# Patient Record
Sex: MALE | Race: WHITE | NOT HISPANIC OR LATINO | Employment: OTHER | RURAL
[De-identification: names, ages, dates, MRNs, and addresses within clinical notes are randomized per-mention and may not be internally consistent; named-entity substitution may affect disease eponyms.]

---

## 2017-03-02 ENCOUNTER — HISTORICAL (OUTPATIENT)
Dept: ADMINISTRATIVE | Facility: HOSPITAL | Age: 72
End: 2017-03-02

## 2017-03-08 LAB
LAB AP CLINICAL INFORMATION: NORMAL
LAB AP DIAGNOSIS - HISTORICAL: NORMAL
LAB AP GROSS PATHOLOGY - HISTORICAL: NORMAL
LAB AP SPECIMEN SUBMITTED - HISTORICAL: NORMAL

## 2020-12-11 ENCOUNTER — HISTORICAL (OUTPATIENT)
Dept: ADMINISTRATIVE | Facility: HOSPITAL | Age: 75
End: 2020-12-11

## 2020-12-11 LAB
ALBUMIN SERPL BCP-MCNC: 3 G/DL (ref 3.5–5)
ALBUMIN/GLOB SERPL: 1 {RATIO}
ALP SERPL-CCNC: 103 U/L (ref 45–115)
ALT SERPL W P-5'-P-CCNC: 14 U/L (ref 16–61)
ANION GAP SERPL CALCULATED.3IONS-SCNC: 11 MMOL/L
AST SERPL W P-5'-P-CCNC: 13 U/L (ref 15–37)
BASOPHILS # BLD AUTO: 0.04 X10E3/UL (ref 0–0.2)
BASOPHILS NFR BLD AUTO: 0.6 % (ref 0–1)
BILIRUB SERPL-MCNC: 0.5 MG/DL (ref 0–1.2)
BUN SERPL-MCNC: 20 MG/DL (ref 7–18)
BUN/CREAT SERPL: 18.7
CALCIUM SERPL-MCNC: 8.3 MG/DL (ref 8.5–10.1)
CHLORIDE SERPL-SCNC: 107 MMOL/L (ref 98–107)
CHOLEST SERPL-MCNC: 159 MG/DL
CHOLEST/HDLC SERPL: 3.6 {RATIO}
CO2 SERPL-SCNC: 25 MMOL/L (ref 21–32)
CREAT SERPL-MCNC: 1.07 MG/DL (ref 0.7–1.3)
EOSINOPHIL # BLD AUTO: 0.25 X10E3/UL (ref 0–0.5)
EOSINOPHIL NFR BLD AUTO: 3.8 % (ref 1–4)
ERYTHROCYTE [DISTWIDTH] IN BLOOD BY AUTOMATED COUNT: 13.5 % (ref 11.5–14.5)
GLOBULIN SER-MCNC: 3.1 G/DL (ref 2–4)
GLUCOSE SERPL-MCNC: 93 MG/DL (ref 74–106)
HCT VFR BLD AUTO: 41.7 % (ref 40–54)
HDLC SERPL-MCNC: 44 MG/DL
HGB BLD-MCNC: 13.4 G/DL (ref 13.5–18)
IMM GRANULOCYTES # BLD AUTO: 0.01 X10E3/UL (ref 0–0.04)
IMM GRANULOCYTES NFR BLD: 0.2 % (ref 0–0.4)
LDLC SERPL CALC-MCNC: 94 MG/DL
LYMPHOCYTES # BLD AUTO: 2.17 X10E3/UL (ref 1–4.8)
LYMPHOCYTES NFR BLD AUTO: 32.7 % (ref 27–41)
MCH RBC QN AUTO: 30.3 PG (ref 27–31)
MCHC RBC AUTO-ENTMCNC: 32.1 G/DL (ref 32–36)
MCV RBC AUTO: 94.3 FL (ref 80–96)
MONOCYTES # BLD AUTO: 0.72 X10E3/UL (ref 0–0.8)
MONOCYTES NFR BLD AUTO: 10.8 % (ref 2–6)
MPC BLD CALC-MCNC: 10.6 FL (ref 9.4–12.4)
NEUTROPHILS # BLD AUTO: 3.45 X10E3/UL (ref 1.8–7.7)
NEUTROPHILS NFR BLD AUTO: 51.9 % (ref 53–65)
PLATELET # BLD AUTO: 163 X10E3/UL (ref 150–400)
POTASSIUM SERPL-SCNC: 4.2 MMOL/L (ref 3.5–5.1)
PROT SERPL-MCNC: 6.1 G/DL (ref 6.4–8.2)
RBC # BLD AUTO: 4.42 X10E6/UL (ref 4.6–6.2)
SODIUM SERPL-SCNC: 139 MMOL/L (ref 136–145)
TRIGL SERPL-MCNC: 105 MG/DL
WBC # BLD AUTO: 6.64 X10E3/UL (ref 4.5–11)

## 2021-06-24 ENCOUNTER — LAB REQUISITION (OUTPATIENT)
Dept: LAB | Facility: HOSPITAL | Age: 76
End: 2021-06-24
Attending: FAMILY MEDICINE
Payer: MEDICARE

## 2021-06-24 DIAGNOSIS — D64.9 ANEMIA, UNSPECIFIED: ICD-10-CM

## 2021-06-24 DIAGNOSIS — I63.9 CEREBRAL INFARCTION, UNSPECIFIED: ICD-10-CM

## 2021-06-24 DIAGNOSIS — I48.20 CHRONIC ATRIAL FIBRILLATION, UNSPECIFIED: ICD-10-CM

## 2021-06-24 LAB
ALBUMIN SERPL BCP-MCNC: 2.9 G/DL (ref 3.5–5)
ALBUMIN/GLOB SERPL: 1 {RATIO}
ALP SERPL-CCNC: 101 U/L (ref 45–115)
ALT SERPL W P-5'-P-CCNC: 9 U/L (ref 16–61)
ANION GAP SERPL CALCULATED.3IONS-SCNC: 14 MMOL/L (ref 7–16)
AST SERPL W P-5'-P-CCNC: 11 U/L (ref 15–37)
BASOPHILS # BLD AUTO: 0.04 K/UL (ref 0–0.2)
BASOPHILS NFR BLD AUTO: 0.5 % (ref 0–1)
BILIRUB SERPL-MCNC: 0.4 MG/DL (ref 0–1.2)
BUN SERPL-MCNC: 17 MG/DL (ref 7–18)
BUN/CREAT SERPL: 14 (ref 6–20)
CALCIUM SERPL-MCNC: 8.2 MG/DL (ref 8.5–10.1)
CHLORIDE SERPL-SCNC: 107 MMOL/L (ref 98–107)
CO2 SERPL-SCNC: 26 MMOL/L (ref 21–32)
CREAT SERPL-MCNC: 1.21 MG/DL (ref 0.7–1.3)
DIFFERENTIAL METHOD BLD: ABNORMAL
EOSINOPHIL # BLD AUTO: 0.29 K/UL (ref 0–0.5)
EOSINOPHIL NFR BLD AUTO: 3.5 % (ref 1–4)
ERYTHROCYTE [DISTWIDTH] IN BLOOD BY AUTOMATED COUNT: 14 % (ref 11.5–14.5)
GLOBULIN SER-MCNC: 2.9 G/DL (ref 2–4)
GLUCOSE SERPL-MCNC: 97 MG/DL (ref 74–106)
HCT VFR BLD AUTO: 44.3 % (ref 40–54)
HGB BLD-MCNC: 14 G/DL (ref 13.5–18)
IMM GRANULOCYTES # BLD AUTO: 0.03 K/UL (ref 0–0.04)
IMM GRANULOCYTES NFR BLD: 0.4 % (ref 0–0.4)
LYMPHOCYTES # BLD AUTO: 2.12 K/UL (ref 1–4.8)
LYMPHOCYTES NFR BLD AUTO: 25.9 % (ref 27–41)
MCH RBC QN AUTO: 30.2 PG (ref 27–31)
MCHC RBC AUTO-ENTMCNC: 31.6 G/DL (ref 32–36)
MCV RBC AUTO: 95.5 FL (ref 80–96)
MONOCYTES # BLD AUTO: 0.78 K/UL (ref 0–0.8)
MONOCYTES NFR BLD AUTO: 9.5 % (ref 2–6)
MPC BLD CALC-MCNC: 10.5 FL (ref 9.4–12.4)
NEUTROPHILS # BLD AUTO: 4.93 K/UL (ref 1.8–7.7)
NEUTROPHILS NFR BLD AUTO: 60.2 % (ref 53–65)
PLATELET # BLD AUTO: 173 K/UL (ref 150–400)
POTASSIUM SERPL-SCNC: 4.3 MMOL/L (ref 3.5–5.1)
PROT SERPL-MCNC: 5.8 G/DL (ref 6.4–8.2)
RBC # BLD AUTO: 4.64 M/UL (ref 4.6–6.2)
SODIUM SERPL-SCNC: 143 MMOL/L (ref 136–145)
WBC # BLD AUTO: 8.19 K/UL (ref 4.5–11)

## 2021-06-24 PROCEDURE — 85025 COMPLETE CBC W/AUTO DIFF WBC: CPT | Performed by: FAMILY MEDICINE

## 2021-06-24 PROCEDURE — 80053 COMPREHEN METABOLIC PANEL: CPT | Performed by: FAMILY MEDICINE

## 2021-12-08 ENCOUNTER — LAB REQUISITION (OUTPATIENT)
Dept: LAB | Facility: HOSPITAL | Age: 76
End: 2021-12-08
Attending: FAMILY MEDICINE
Payer: MEDICARE

## 2021-12-08 DIAGNOSIS — E78.5 HYPERLIPIDEMIA, UNSPECIFIED: ICD-10-CM

## 2021-12-08 DIAGNOSIS — I48.20 CHRONIC ATRIAL FIBRILLATION, UNSPECIFIED: ICD-10-CM

## 2021-12-08 DIAGNOSIS — I10 ESSENTIAL (PRIMARY) HYPERTENSION: ICD-10-CM

## 2021-12-08 DIAGNOSIS — I63.9 CEREBRAL INFARCTION, UNSPECIFIED: ICD-10-CM

## 2021-12-08 LAB
ALBUMIN SERPL BCP-MCNC: 3 G/DL (ref 3.5–5)
ALBUMIN/GLOB SERPL: 1 {RATIO}
ALP SERPL-CCNC: 97 U/L (ref 45–115)
ALT SERPL W P-5'-P-CCNC: 11 U/L (ref 16–61)
ANION GAP SERPL CALCULATED.3IONS-SCNC: 12 MMOL/L (ref 7–16)
AST SERPL W P-5'-P-CCNC: 15 U/L (ref 15–37)
BASOPHILS # BLD AUTO: 0.04 K/UL (ref 0–0.2)
BASOPHILS NFR BLD AUTO: 0.5 % (ref 0–1)
BILIRUB SERPL-MCNC: 0.5 MG/DL (ref 0–1.2)
BUN SERPL-MCNC: 19 MG/DL (ref 7–18)
BUN/CREAT SERPL: 15 (ref 6–20)
CALCIUM SERPL-MCNC: 8.2 MG/DL (ref 8.5–10.1)
CHLORIDE SERPL-SCNC: 107 MMOL/L (ref 98–107)
CO2 SERPL-SCNC: 28 MMOL/L (ref 21–32)
CREAT SERPL-MCNC: 1.28 MG/DL (ref 0.7–1.3)
DIFFERENTIAL METHOD BLD: ABNORMAL
EOSINOPHIL # BLD AUTO: 0.25 K/UL (ref 0–0.5)
EOSINOPHIL NFR BLD AUTO: 3.1 % (ref 1–4)
ERYTHROCYTE [DISTWIDTH] IN BLOOD BY AUTOMATED COUNT: 14.4 % (ref 11.5–14.5)
GLOBULIN SER-MCNC: 3.1 G/DL (ref 2–4)
GLUCOSE SERPL-MCNC: 86 MG/DL (ref 74–106)
HCT VFR BLD AUTO: 44.9 % (ref 40–54)
HGB BLD-MCNC: 14.1 G/DL (ref 13.5–18)
IMM GRANULOCYTES # BLD AUTO: 0.01 K/UL (ref 0–0.04)
IMM GRANULOCYTES NFR BLD: 0.1 % (ref 0–0.4)
LYMPHOCYTES # BLD AUTO: 2.24 K/UL (ref 1–4.8)
LYMPHOCYTES NFR BLD AUTO: 28 % (ref 27–41)
MCH RBC QN AUTO: 30.1 PG (ref 27–31)
MCHC RBC AUTO-ENTMCNC: 31.4 G/DL (ref 32–36)
MCV RBC AUTO: 95.9 FL (ref 80–96)
MONOCYTES # BLD AUTO: 0.72 K/UL (ref 0–0.8)
MONOCYTES NFR BLD AUTO: 9 % (ref 2–6)
MPC BLD CALC-MCNC: 10.6 FL (ref 9.4–12.4)
NEUTROPHILS # BLD AUTO: 4.73 K/UL (ref 1.8–7.7)
NEUTROPHILS NFR BLD AUTO: 59.3 % (ref 53–65)
PLATELET # BLD AUTO: 173 K/UL (ref 150–400)
POTASSIUM SERPL-SCNC: 4.5 MMOL/L (ref 3.5–5.1)
PROT SERPL-MCNC: 6.1 G/DL (ref 6.4–8.2)
RBC # BLD AUTO: 4.68 M/UL (ref 4.6–6.2)
SODIUM SERPL-SCNC: 142 MMOL/L (ref 136–145)
WBC # BLD AUTO: 7.99 K/UL (ref 4.5–11)

## 2021-12-08 PROCEDURE — 84075 ASSAY ALKALINE PHOSPHATASE: CPT | Performed by: FAMILY MEDICINE

## 2021-12-08 PROCEDURE — 80053 COMPREHEN METABOLIC PANEL: CPT | Performed by: FAMILY MEDICINE

## 2021-12-08 PROCEDURE — 84450 TRANSFERASE (AST) (SGOT): CPT | Performed by: FAMILY MEDICINE

## 2021-12-08 PROCEDURE — 85025 COMPLETE CBC W/AUTO DIFF WBC: CPT | Performed by: FAMILY MEDICINE

## 2022-01-01 ENCOUNTER — LAB REQUISITION (OUTPATIENT)
Dept: LAB | Facility: HOSPITAL | Age: 77
End: 2022-01-01
Attending: FAMILY MEDICINE
Payer: MEDICARE

## 2022-01-01 ENCOUNTER — HOSPITAL ENCOUNTER (EMERGENCY)
Facility: HOSPITAL | Age: 77
Discharge: NURSING, REHAB OR LONG-TERM CARE | End: 2022-12-21
Attending: EMERGENCY MEDICINE
Payer: MEDICARE

## 2022-01-01 ENCOUNTER — HOSPITAL ENCOUNTER (OUTPATIENT)
Dept: RADIOLOGY | Facility: HOSPITAL | Age: 77
Discharge: HOME | End: 2022-11-18
Attending: FAMILY MEDICINE
Payer: MEDICARE

## 2022-01-01 VITALS
BODY MASS INDEX: 26.36 KG/M2 | HEIGHT: 66 IN | SYSTOLIC BLOOD PRESSURE: 165 MMHG | OXYGEN SATURATION: 97 % | WEIGHT: 164 LBS | DIASTOLIC BLOOD PRESSURE: 86 MMHG | HEART RATE: 106 BPM | RESPIRATION RATE: 16 BRPM | TEMPERATURE: 98 F

## 2022-01-01 DIAGNOSIS — D64.9 ANEMIA, UNSPECIFIED: ICD-10-CM

## 2022-01-01 DIAGNOSIS — I48.91 ATRIAL FIBRILLATION WITH TACHYCARDIC VENTRICULAR RATE: Primary | ICD-10-CM

## 2022-01-01 DIAGNOSIS — I10 ESSENTIAL (PRIMARY) HYPERTENSION: ICD-10-CM

## 2022-01-01 DIAGNOSIS — R97.20 ELEVATED PROSTATE SPECIFIC ANTIGEN (PSA): ICD-10-CM

## 2022-01-01 DIAGNOSIS — K76.9 LIVER DISEASE, UNSPECIFIED: ICD-10-CM

## 2022-01-01 DIAGNOSIS — I63.9 CEREBRAL INFARCTION, UNSPECIFIED: ICD-10-CM

## 2022-01-01 DIAGNOSIS — R79.1 ABNORMAL COAGULATION PROFILE: ICD-10-CM

## 2022-01-01 DIAGNOSIS — I11.0 HYPERTENSIVE HEART DISEASE WITH HEART FAILURE: ICD-10-CM

## 2022-01-01 DIAGNOSIS — I48.20 CHRONIC ATRIAL FIBRILLATION, UNSPECIFIED: ICD-10-CM

## 2022-01-01 DIAGNOSIS — I69.351 HEMIPLEGIA AND HEMIPARESIS FOLLOWING CEREBRAL INFARCTION AFFECTING RIGHT DOMINANT SIDE: ICD-10-CM

## 2022-01-01 DIAGNOSIS — N40.0 BENIGN PROSTATIC HYPERPLASIA WITHOUT LOWER URINARY TRACT SYMPTOMS: ICD-10-CM

## 2022-01-01 DIAGNOSIS — E78.5 HYPERLIPIDEMIA, UNSPECIFIED: ICD-10-CM

## 2022-01-01 DIAGNOSIS — I50.9 CHRONIC CONGESTIVE HEART FAILURE, UNSPECIFIED HEART FAILURE TYPE: ICD-10-CM

## 2022-01-01 DIAGNOSIS — R16.0 LIVER MASS: ICD-10-CM

## 2022-01-01 DIAGNOSIS — R07.9 CHEST PAIN: ICD-10-CM

## 2022-01-01 LAB
ALBUMIN SERPL BCP-MCNC: 2.8 G/DL (ref 3.5–5)
ALBUMIN SERPL BCP-MCNC: 2.9 G/DL (ref 3.5–5)
ALBUMIN SERPL BCP-MCNC: 2.9 G/DL (ref 3.5–5)
ALBUMIN SERPL BCP-MCNC: 3 G/DL (ref 3.5–5)
ALBUMIN SERPL BCP-MCNC: 3.1 G/DL (ref 3.5–5)
ALBUMIN SERPL BCP-MCNC: 3.1 G/DL (ref 3.5–5)
ALBUMIN/GLOB SERPL: 0.9 {RATIO}
ALBUMIN/GLOB SERPL: 1 {RATIO}
ALP SERPL-CCNC: 141 U/L (ref 45–115)
ALP SERPL-CCNC: 145 U/L (ref 45–115)
ALP SERPL-CCNC: 150 U/L (ref 45–115)
ALP SERPL-CCNC: 157 U/L (ref 45–115)
ALP SERPL-CCNC: 179 U/L (ref 45–115)
ALP SERPL-CCNC: 346 U/L (ref 45–115)
ALP SERPL-CCNC: 412 U/L (ref 45–115)
ALP SERPL-CCNC: 424 U/L (ref 45–115)
ALP SERPL-CCNC: 96 U/L (ref 45–115)
ALT SERPL W P-5'-P-CCNC: 10 U/L (ref 16–61)
ALT SERPL W P-5'-P-CCNC: 14 U/L (ref 16–61)
ALT SERPL W P-5'-P-CCNC: 167 U/L (ref 16–61)
ALT SERPL W P-5'-P-CCNC: 17 U/L (ref 16–61)
ALT SERPL W P-5'-P-CCNC: 18 U/L (ref 16–61)
ALT SERPL W P-5'-P-CCNC: 185 U/L (ref 16–61)
ALT SERPL W P-5'-P-CCNC: 20 U/L (ref 16–61)
ALT SERPL W P-5'-P-CCNC: 215 U/L (ref 16–61)
ALT SERPL W P-5'-P-CCNC: 28 U/L (ref 16–61)
ANION GAP SERPL CALCULATED.3IONS-SCNC: 11 MMOL/L (ref 7–16)
ANION GAP SERPL CALCULATED.3IONS-SCNC: 11 MMOL/L (ref 7–16)
ANION GAP SERPL CALCULATED.3IONS-SCNC: 12 MMOL/L (ref 7–16)
ANION GAP SERPL CALCULATED.3IONS-SCNC: 12 MMOL/L (ref 7–16)
ANION GAP SERPL CALCULATED.3IONS-SCNC: 13 MMOL/L (ref 7–16)
ANION GAP SERPL CALCULATED.3IONS-SCNC: 8 MMOL/L (ref 7–16)
APTT PPP: 32.8 SECONDS (ref 25.2–37.3)
AST SERPL W P-5'-P-CCNC: 10 U/L (ref 15–37)
AST SERPL W P-5'-P-CCNC: 106 U/L (ref 15–37)
AST SERPL W P-5'-P-CCNC: 114 U/L (ref 15–37)
AST SERPL W P-5'-P-CCNC: 21 U/L (ref 15–37)
AST SERPL W P-5'-P-CCNC: 23 U/L (ref 15–37)
AST SERPL W P-5'-P-CCNC: 26 U/L (ref 15–37)
AST SERPL W P-5'-P-CCNC: 30 U/L (ref 15–37)
AST SERPL W P-5'-P-CCNC: 53 U/L (ref 15–37)
AST SERPL W P-5'-P-CCNC: 85 U/L (ref 15–37)
BASOPHILS # BLD AUTO: 0.04 K/UL (ref 0–0.2)
BASOPHILS # BLD AUTO: 0.05 K/UL (ref 0–0.2)
BASOPHILS # BLD AUTO: 0.06 K/UL (ref 0–0.2)
BASOPHILS # BLD AUTO: 0.07 K/UL (ref 0–0.2)
BASOPHILS # BLD AUTO: 0.08 K/UL (ref 0–0.2)
BASOPHILS NFR BLD AUTO: 0.5 % (ref 0–1)
BASOPHILS NFR BLD AUTO: 0.6 % (ref 0–1)
BASOPHILS NFR BLD AUTO: 0.6 % (ref 0–1)
BASOPHILS NFR BLD AUTO: 0.7 % (ref 0–1)
BASOPHILS NFR BLD AUTO: 0.8 % (ref 0–1)
BASOPHILS NFR BLD AUTO: 0.9 % (ref 0–1)
BASOPHILS NFR BLD AUTO: 1.1 % (ref 0–1)
BILIRUB DIRECT SERPL-MCNC: 5.1 MG/DL (ref 0–0.2)
BILIRUB SERPL-MCNC: 0.4 MG/DL (ref 0–1.2)
BILIRUB SERPL-MCNC: 0.6 MG/DL (ref ?–1.2)
BILIRUB SERPL-MCNC: 0.6 MG/DL (ref ?–1.2)
BILIRUB SERPL-MCNC: 0.7 MG/DL (ref ?–1.2)
BILIRUB SERPL-MCNC: 0.7 MG/DL (ref ?–1.2)
BILIRUB SERPL-MCNC: 0.8 MG/DL (ref ?–1.2)
BILIRUB SERPL-MCNC: 2.1 MG/DL (ref ?–1.2)
BILIRUB SERPL-MCNC: 5.4 MG/DL (ref ?–1.2)
BILIRUB SERPL-MCNC: 5.9 MG/DL (ref ?–1.2)
BUN SERPL-MCNC: 14 MG/DL (ref 7–18)
BUN SERPL-MCNC: 15 MG/DL (ref 7–18)
BUN SERPL-MCNC: 17 MG/DL (ref 7–18)
BUN SERPL-MCNC: 18 MG/DL (ref 7–18)
BUN SERPL-MCNC: 18 MG/DL (ref 7–18)
BUN SERPL-MCNC: 19 MG/DL (ref 7–18)
BUN SERPL-MCNC: 20 MG/DL (ref 7–18)
BUN SERPL-MCNC: 20 MG/DL (ref 7–18)
BUN SERPL-MCNC: 28 MG/DL (ref 7–18)
BUN/CREAT SERPL: 12 (ref 6–20)
BUN/CREAT SERPL: 12 (ref 6–20)
BUN/CREAT SERPL: 14 (ref 6–20)
BUN/CREAT SERPL: 15 (ref 6–20)
BUN/CREAT SERPL: 16 (ref 6–20)
BUN/CREAT SERPL: 18 (ref 6–20)
BUN/CREAT SERPL: 21 (ref 6–20)
CALCIUM SERPL-MCNC: 8.1 MG/DL (ref 8.5–10.1)
CALCIUM SERPL-MCNC: 8.2 MG/DL (ref 8.5–10.1)
CALCIUM SERPL-MCNC: 8.2 MG/DL (ref 8.5–10.1)
CALCIUM SERPL-MCNC: 8.4 MG/DL (ref 8.5–10.1)
CALCIUM SERPL-MCNC: 8.5 MG/DL (ref 8.5–10.1)
CALCIUM SERPL-MCNC: 8.5 MG/DL (ref 8.5–10.1)
CALCIUM SERPL-MCNC: 8.7 MG/DL (ref 8.5–10.1)
CHLORIDE SERPL-SCNC: 100 MMOL/L (ref 98–107)
CHLORIDE SERPL-SCNC: 103 MMOL/L (ref 98–107)
CHLORIDE SERPL-SCNC: 107 MMOL/L (ref 98–107)
CHLORIDE SERPL-SCNC: 107 MMOL/L (ref 98–107)
CHLORIDE SERPL-SCNC: 108 MMOL/L (ref 98–107)
CHLORIDE SERPL-SCNC: 108 MMOL/L (ref 98–107)
CHLORIDE SERPL-SCNC: 109 MMOL/L (ref 98–107)
CHLORIDE SERPL-SCNC: 109 MMOL/L (ref 98–107)
CHLORIDE SERPL-SCNC: 110 MMOL/L (ref 98–107)
CO2 SERPL-SCNC: 24 MMOL/L (ref 21–32)
CO2 SERPL-SCNC: 24 MMOL/L (ref 21–32)
CO2 SERPL-SCNC: 25 MMOL/L (ref 21–32)
CO2 SERPL-SCNC: 27 MMOL/L (ref 21–32)
CO2 SERPL-SCNC: 29 MMOL/L (ref 21–32)
CREAT SERPL-MCNC: 1.12 MG/DL (ref 0.7–1.3)
CREAT SERPL-MCNC: 1.12 MG/DL (ref 0.7–1.3)
CREAT SERPL-MCNC: 1.15 MG/DL (ref 0.7–1.3)
CREAT SERPL-MCNC: 1.17 MG/DL (ref 0.7–1.3)
CREAT SERPL-MCNC: 1.2 MG/DL (ref 0.7–1.3)
CREAT SERPL-MCNC: 1.26 MG/DL (ref 0.7–1.3)
CREAT SERPL-MCNC: 1.32 MG/DL (ref 0.7–1.3)
CREAT SERPL-MCNC: 1.32 MG/DL (ref 0.7–1.3)
CREAT SERPL-MCNC: 1.35 MG/DL (ref 0.7–1.3)
DIFFERENTIAL METHOD BLD: ABNORMAL
EGFR (NO RACE VARIABLE) (RUSH/TITUS): 54 ML/MIN/1.73M²
EGFR (NO RACE VARIABLE) (RUSH/TITUS): 56 ML/MIN/1.73M²
EGFR (NO RACE VARIABLE) (RUSH/TITUS): 56 ML/MIN/1.73M²
EGFR (NO RACE VARIABLE) (RUSH/TITUS): 59 ML/MIN/1.73M²
EGFR (NO RACE VARIABLE) (RUSH/TITUS): 63 ML/MIN/1.73M²
EGFR (NO RACE VARIABLE) (RUSH/TITUS): 66 ML/MIN/1.73M²
EGFR (NO RACE VARIABLE) (RUSH/TITUS): 68 ML/MIN/1.73M²
EGFR (NO RACE VARIABLE) (RUSH/TITUS): 68 ML/MIN/1.73M²
EOSINOPHIL # BLD AUTO: 0.18 K/UL (ref 0–0.5)
EOSINOPHIL # BLD AUTO: 0.2 K/UL (ref 0–0.5)
EOSINOPHIL # BLD AUTO: 0.25 K/UL (ref 0–0.5)
EOSINOPHIL # BLD AUTO: 0.26 K/UL (ref 0–0.5)
EOSINOPHIL # BLD AUTO: 0.27 K/UL (ref 0–0.5)
EOSINOPHIL # BLD AUTO: 0.27 K/UL (ref 0–0.5)
EOSINOPHIL # BLD AUTO: 0.32 K/UL (ref 0–0.5)
EOSINOPHIL # BLD AUTO: 0.33 K/UL (ref 0–0.5)
EOSINOPHIL # BLD AUTO: 0.43 K/UL (ref 0–0.5)
EOSINOPHIL NFR BLD AUTO: 2.1 % (ref 1–4)
EOSINOPHIL NFR BLD AUTO: 2.6 % (ref 1–4)
EOSINOPHIL NFR BLD AUTO: 3.1 % (ref 1–4)
EOSINOPHIL NFR BLD AUTO: 3.5 % (ref 1–4)
EOSINOPHIL NFR BLD AUTO: 3.6 % (ref 1–4)
EOSINOPHIL NFR BLD AUTO: 3.6 % (ref 1–4)
EOSINOPHIL NFR BLD AUTO: 4 % (ref 1–4)
EOSINOPHIL NFR BLD AUTO: 4.6 % (ref 1–4)
EOSINOPHIL NFR BLD AUTO: 5.8 % (ref 1–4)
ERYTHROCYTE [DISTWIDTH] IN BLOOD BY AUTOMATED COUNT: 13.4 % (ref 11.5–14.5)
ERYTHROCYTE [DISTWIDTH] IN BLOOD BY AUTOMATED COUNT: 13.7 % (ref 11.5–14.5)
ERYTHROCYTE [DISTWIDTH] IN BLOOD BY AUTOMATED COUNT: 13.7 % (ref 11.5–14.5)
ERYTHROCYTE [DISTWIDTH] IN BLOOD BY AUTOMATED COUNT: 13.9 % (ref 11.5–14.5)
ERYTHROCYTE [DISTWIDTH] IN BLOOD BY AUTOMATED COUNT: 14.1 % (ref 11.5–14.5)
ERYTHROCYTE [DISTWIDTH] IN BLOOD BY AUTOMATED COUNT: 14.3 % (ref 11.5–14.5)
ERYTHROCYTE [DISTWIDTH] IN BLOOD BY AUTOMATED COUNT: 14.5 % (ref 11.5–14.5)
ERYTHROCYTE [DISTWIDTH] IN BLOOD BY AUTOMATED COUNT: 14.6 % (ref 11.5–14.5)
ERYTHROCYTE [DISTWIDTH] IN BLOOD BY AUTOMATED COUNT: 14.7 % (ref 11.5–14.5)
FERRITIN SERPL-MCNC: 947 NG/ML (ref 26–388)
FOLATE SERPL-MCNC: 9.7 NG/ML (ref 3.1–17.5)
GLOBULIN SER-MCNC: 2.7 G/DL (ref 2–4)
GLOBULIN SER-MCNC: 3 G/DL (ref 2–4)
GLOBULIN SER-MCNC: 3.1 G/DL (ref 2–4)
GLOBULIN SER-MCNC: 3.1 G/DL (ref 2–4)
GLOBULIN SER-MCNC: 3.2 G/DL (ref 2–4)
GLOBULIN SER-MCNC: 3.2 G/DL (ref 2–4)
GLOBULIN SER-MCNC: 3.3 G/DL (ref 2–4)
GLOBULIN SER-MCNC: 3.3 G/DL (ref 2–4)
GLOBULIN SER-MCNC: 3.6 G/DL (ref 2–4)
GLUCOSE SERPL-MCNC: 100 MG/DL (ref 74–106)
GLUCOSE SERPL-MCNC: 112 MG/DL (ref 74–106)
GLUCOSE SERPL-MCNC: 112 MG/DL (ref 74–106)
GLUCOSE SERPL-MCNC: 140 MG/DL (ref 74–106)
GLUCOSE SERPL-MCNC: 86 MG/DL (ref 74–106)
GLUCOSE SERPL-MCNC: 86 MG/DL (ref 74–106)
GLUCOSE SERPL-MCNC: 90 MG/DL (ref 74–106)
GLUCOSE SERPL-MCNC: 90 MG/DL (ref 74–106)
GLUCOSE SERPL-MCNC: 91 MG/DL (ref 74–106)
HAV IGM SER QL: NORMAL
HBV CORE IGM SER QL: NORMAL
HBV SURFACE AG SERPL QL IA: NORMAL
HCT VFR BLD AUTO: 37.4 % (ref 40–54)
HCT VFR BLD AUTO: 38.7 % (ref 40–54)
HCT VFR BLD AUTO: 39.2 % (ref 40–54)
HCT VFR BLD AUTO: 39.7 % (ref 40–54)
HCT VFR BLD AUTO: 40.6 % (ref 40–54)
HCT VFR BLD AUTO: 40.6 % (ref 40–54)
HCT VFR BLD AUTO: 41 % (ref 40–54)
HCT VFR BLD AUTO: 42.2 % (ref 40–54)
HCT VFR BLD AUTO: 46.5 % (ref 40–54)
HCV AB SER QL: NORMAL
HGB BLD-MCNC: 11.9 G/DL (ref 13.5–18)
HGB BLD-MCNC: 12.1 G/DL (ref 13.5–18)
HGB BLD-MCNC: 12.6 G/DL (ref 13.5–18)
HGB BLD-MCNC: 12.6 G/DL (ref 13.5–18)
HGB BLD-MCNC: 12.9 G/DL (ref 13.5–18)
HGB BLD-MCNC: 13 G/DL (ref 13.5–18)
HGB BLD-MCNC: 13.1 G/DL (ref 13.5–18)
HGB BLD-MCNC: 13.4 G/DL (ref 13.5–18)
HGB BLD-MCNC: 15 G/DL (ref 13.5–18)
IMM GRANULOCYTES # BLD AUTO: 0.01 K/UL (ref 0–0.04)
IMM GRANULOCYTES # BLD AUTO: 0.02 K/UL (ref 0–0.04)
IMM GRANULOCYTES NFR BLD: 0.1 % (ref 0–0.4)
IMM GRANULOCYTES NFR BLD: 0.2 % (ref 0–0.4)
IMM GRANULOCYTES NFR BLD: 0.2 % (ref 0–0.4)
IMM GRANULOCYTES NFR BLD: 0.3 % (ref 0–0.4)
IMM GRANULOCYTES NFR BLD: 0.3 % (ref 0–0.4)
IMM RETICS NFR: 5.8 % (ref 2.3–13.4)
INR BLD: 0.96
IRON SATN MFR SERPL: 69 % (ref 14–50)
IRON SERPL-MCNC: 116 ΜG/DL (ref 65–175)
LYMPHOCYTES # BLD AUTO: 1.39 K/UL (ref 1–4.8)
LYMPHOCYTES # BLD AUTO: 1.46 K/UL (ref 1–4.8)
LYMPHOCYTES # BLD AUTO: 1.88 K/UL (ref 1–4.8)
LYMPHOCYTES # BLD AUTO: 1.89 K/UL (ref 1–4.8)
LYMPHOCYTES # BLD AUTO: 1.99 K/UL (ref 1–4.8)
LYMPHOCYTES # BLD AUTO: 2.03 K/UL (ref 1–4.8)
LYMPHOCYTES # BLD AUTO: 2.03 K/UL (ref 1–4.8)
LYMPHOCYTES # BLD AUTO: 2.07 K/UL (ref 1–4.8)
LYMPHOCYTES # BLD AUTO: 2.71 K/UL (ref 1–4.8)
LYMPHOCYTES NFR BLD AUTO: 14.8 % (ref 27–41)
LYMPHOCYTES NFR BLD AUTO: 21.4 % (ref 27–41)
LYMPHOCYTES NFR BLD AUTO: 23.4 % (ref 27–41)
LYMPHOCYTES NFR BLD AUTO: 26.3 % (ref 27–41)
LYMPHOCYTES NFR BLD AUTO: 26.7 % (ref 27–41)
LYMPHOCYTES NFR BLD AUTO: 26.9 % (ref 27–41)
LYMPHOCYTES NFR BLD AUTO: 28 % (ref 27–41)
LYMPHOCYTES NFR BLD AUTO: 29.5 % (ref 27–41)
LYMPHOCYTES NFR BLD AUTO: 30.2 % (ref 27–41)
MCH RBC QN AUTO: 30.3 PG (ref 27–31)
MCH RBC QN AUTO: 30.4 PG (ref 27–31)
MCH RBC QN AUTO: 30.6 PG (ref 27–31)
MCH RBC QN AUTO: 30.6 PG (ref 27–31)
MCH RBC QN AUTO: 30.7 PG (ref 27–31)
MCH RBC QN AUTO: 30.7 PG (ref 27–31)
MCH RBC QN AUTO: 30.8 PG (ref 27–31)
MCH RBC QN AUTO: 31 PG (ref 27–31)
MCH RBC QN AUTO: 31.2 PG (ref 27–31)
MCHC RBC AUTO-ENTMCNC: 31.3 G/DL (ref 32–36)
MCHC RBC AUTO-ENTMCNC: 31.7 G/DL (ref 32–36)
MCHC RBC AUTO-ENTMCNC: 31.7 G/DL (ref 32–36)
MCHC RBC AUTO-ENTMCNC: 31.8 G/DL (ref 32–36)
MCHC RBC AUTO-ENTMCNC: 32.1 G/DL (ref 32–36)
MCHC RBC AUTO-ENTMCNC: 32.3 G/DL (ref 32–36)
MCHC RBC AUTO-ENTMCNC: 32.3 G/DL (ref 32–36)
MCV RBC AUTO: 94 FL (ref 80–96)
MCV RBC AUTO: 95.1 FL (ref 80–96)
MCV RBC AUTO: 96.2 FL (ref 80–96)
MCV RBC AUTO: 96.3 FL (ref 80–96)
MCV RBC AUTO: 96.6 FL (ref 80–96)
MCV RBC AUTO: 96.9 FL (ref 80–96)
MCV RBC AUTO: 97.1 FL (ref 80–96)
MCV RBC AUTO: 97.2 FL (ref 80–96)
MCV RBC AUTO: 97.9 FL (ref 80–96)
MONOCYTES # BLD AUTO: 0.86 K/UL (ref 0–0.8)
MONOCYTES # BLD AUTO: 0.86 K/UL (ref 0–0.8)
MONOCYTES # BLD AUTO: 0.87 K/UL (ref 0–0.8)
MONOCYTES # BLD AUTO: 0.9 K/UL (ref 0–0.8)
MONOCYTES # BLD AUTO: 0.91 K/UL (ref 0–0.8)
MONOCYTES # BLD AUTO: 0.93 K/UL (ref 0–0.8)
MONOCYTES # BLD AUTO: 0.94 K/UL (ref 0–0.8)
MONOCYTES # BLD AUTO: 0.97 K/UL (ref 0–0.8)
MONOCYTES # BLD AUTO: 1 K/UL (ref 0–0.8)
MONOCYTES NFR BLD AUTO: 10.5 % (ref 2–6)
MONOCYTES NFR BLD AUTO: 10.6 % (ref 2–6)
MONOCYTES NFR BLD AUTO: 11.2 % (ref 2–6)
MONOCYTES NFR BLD AUTO: 11.8 % (ref 2–6)
MONOCYTES NFR BLD AUTO: 12.1 % (ref 2–6)
MONOCYTES NFR BLD AUTO: 12.3 % (ref 2–6)
MONOCYTES NFR BLD AUTO: 12.5 % (ref 2–6)
MONOCYTES NFR BLD AUTO: 12.6 % (ref 2–6)
MONOCYTES NFR BLD AUTO: 13.6 % (ref 2–6)
MPC BLD CALC-MCNC: 10.2 FL (ref 9.4–12.4)
MPC BLD CALC-MCNC: 10.3 FL (ref 9.4–12.4)
MPC BLD CALC-MCNC: 10.4 FL (ref 9.4–12.4)
MPC BLD CALC-MCNC: 10.5 FL (ref 9.4–12.4)
MPC BLD CALC-MCNC: 10.7 FL (ref 9.4–12.4)
MPC BLD CALC-MCNC: 10.9 FL (ref 9.4–12.4)
MPC BLD CALC-MCNC: 10.9 FL (ref 9.4–12.4)
NEUTROPHILS # BLD AUTO: 3.76 K/UL (ref 1.8–7.7)
NEUTROPHILS # BLD AUTO: 3.89 K/UL (ref 1.8–7.7)
NEUTROPHILS # BLD AUTO: 4.05 K/UL (ref 1.8–7.7)
NEUTROPHILS # BLD AUTO: 4.13 K/UL (ref 1.8–7.7)
NEUTROPHILS # BLD AUTO: 4.17 K/UL (ref 1.8–7.7)
NEUTROPHILS # BLD AUTO: 4.26 K/UL (ref 1.8–7.7)
NEUTROPHILS # BLD AUTO: 4.9 K/UL (ref 1.8–7.7)
NEUTROPHILS # BLD AUTO: 4.98 K/UL (ref 1.8–7.7)
NEUTROPHILS # BLD AUTO: 6.74 K/UL (ref 1.8–7.7)
NEUTROPHILS NFR BLD AUTO: 54.2 % (ref 53–65)
NEUTROPHILS NFR BLD AUTO: 54.5 % (ref 53–65)
NEUTROPHILS NFR BLD AUTO: 54.7 % (ref 53–65)
NEUTROPHILS NFR BLD AUTO: 54.7 % (ref 53–65)
NEUTROPHILS NFR BLD AUTO: 55.7 % (ref 53–65)
NEUTROPHILS NFR BLD AUTO: 56.4 % (ref 53–65)
NEUTROPHILS NFR BLD AUTO: 61 % (ref 53–65)
NEUTROPHILS NFR BLD AUTO: 61.6 % (ref 53–65)
NEUTROPHILS NFR BLD AUTO: 71.8 % (ref 53–65)
NT-PROBNP SERPL-MCNC: 3773 PG/ML (ref 1–450)
PLATELET # BLD AUTO: 155 K/UL (ref 150–400)
PLATELET # BLD AUTO: 158 K/UL (ref 150–400)
PLATELET # BLD AUTO: 163 K/UL (ref 150–400)
PLATELET # BLD AUTO: 172 K/UL (ref 150–400)
PLATELET # BLD AUTO: 173 K/UL (ref 150–400)
PLATELET # BLD AUTO: 174 K/UL (ref 150–400)
PLATELET # BLD AUTO: 174 K/UL (ref 150–400)
PLATELET # BLD AUTO: 179 K/UL (ref 150–400)
PLATELET # BLD AUTO: 189 K/UL (ref 150–400)
POTASSIUM SERPL-SCNC: 3.5 MMOL/L (ref 3.5–5.1)
POTASSIUM SERPL-SCNC: 3.9 MMOL/L (ref 3.5–5.1)
POTASSIUM SERPL-SCNC: 4.1 MMOL/L (ref 3.5–5.1)
POTASSIUM SERPL-SCNC: 4.2 MMOL/L (ref 3.5–5.1)
POTASSIUM SERPL-SCNC: 4.5 MMOL/L (ref 3.5–5.1)
POTASSIUM SERPL-SCNC: 4.8 MMOL/L (ref 3.5–5.1)
PROT SERPL-MCNC: 5.5 G/DL (ref 6.4–8.2)
PROT SERPL-MCNC: 5.8 G/DL (ref 6.4–8.2)
PROT SERPL-MCNC: 6 G/DL (ref 6.4–8.2)
PROT SERPL-MCNC: 6.3 G/DL (ref 6.4–8.2)
PROT SERPL-MCNC: 6.4 G/DL (ref 6.4–8.2)
PROT SERPL-MCNC: 6.7 G/DL (ref 6.4–8.2)
PROTHROMBIN TIME: 12.8 SECONDS (ref 11.7–14.7)
PSA SERPL-MCNC: 0.35 NG/ML
RBC # BLD AUTO: 3.82 M/UL (ref 4.6–6.2)
RBC # BLD AUTO: 3.98 M/UL (ref 4.6–6.2)
RBC # BLD AUTO: 4.06 M/UL (ref 4.6–6.2)
RBC # BLD AUTO: 4.09 M/UL (ref 4.6–6.2)
RBC # BLD AUTO: 4.22 M/UL (ref 4.6–6.2)
RBC # BLD AUTO: 4.23 M/UL (ref 4.6–6.2)
RBC # BLD AUTO: 4.32 M/UL (ref 4.6–6.2)
RBC # BLD AUTO: 4.38 M/UL (ref 4.6–6.2)
RBC # BLD AUTO: 4.89 M/UL (ref 4.6–6.2)
RETICS # AUTO: 0.03 X10E6/UL (ref 0.02–0.11)
RETICS/RBC NFR AUTO: 0.6 % (ref 0.4–2.2)
SODIUM SERPL-SCNC: 134 MMOL/L (ref 136–145)
SODIUM SERPL-SCNC: 135 MMOL/L (ref 136–145)
SODIUM SERPL-SCNC: 140 MMOL/L (ref 136–145)
SODIUM SERPL-SCNC: 143 MMOL/L (ref 136–145)
TIBC SERPL-MCNC: 167 ΜG/DL (ref 250–450)
TROPONIN I SERPL HS-MCNC: 17.6 PG/ML
VIT B12 SERPL-MCNC: 234 PG/ML (ref 193–986)
WBC # BLD AUTO: 6.83 K/UL (ref 4.5–11)
WBC # BLD AUTO: 6.88 K/UL (ref 4.5–11)
WBC # BLD AUTO: 7.14 K/UL (ref 4.5–11)
WBC # BLD AUTO: 7.4 K/UL (ref 4.5–11)
WBC # BLD AUTO: 7.46 K/UL (ref 4.5–11)
WBC # BLD AUTO: 7.55 K/UL (ref 4.5–11)
WBC # BLD AUTO: 8.09 K/UL (ref 4.5–11)
WBC # BLD AUTO: 8.96 K/UL (ref 4.5–11)
WBC # BLD AUTO: 9.4 K/UL (ref 4.5–11)

## 2022-01-01 PROCEDURE — 85025 COMPLETE CBC W/AUTO DIFF WBC: CPT | Performed by: FAMILY MEDICINE

## 2022-01-01 PROCEDURE — 84153 ASSAY OF PSA TOTAL: CPT | Performed by: FAMILY MEDICINE

## 2022-01-01 PROCEDURE — 82607 VITAMIN B-12: CPT | Performed by: FAMILY MEDICINE

## 2022-01-01 PROCEDURE — 80074 ACUTE HEPATITIS PANEL: CPT | Performed by: FAMILY MEDICINE

## 2022-01-01 PROCEDURE — 71260 CT THORAX DX C+: CPT | Mod: TC

## 2022-01-01 PROCEDURE — 93010 ELECTROCARDIOGRAM REPORT: CPT | Mod: ,,, | Performed by: INTERNAL MEDICINE

## 2022-01-01 PROCEDURE — 80053 COMPREHEN METABOLIC PANEL: CPT | Performed by: FAMILY MEDICINE

## 2022-01-01 PROCEDURE — 85025 COMPLETE CBC W/AUTO DIFF WBC: CPT | Performed by: EMERGENCY MEDICINE

## 2022-01-01 PROCEDURE — 82728 ASSAY OF FERRITIN: CPT | Performed by: FAMILY MEDICINE

## 2022-01-01 PROCEDURE — 96375 TX/PRO/DX INJ NEW DRUG ADDON: CPT

## 2022-01-01 PROCEDURE — 96374 THER/PROPH/DIAG INJ IV PUSH: CPT

## 2022-01-01 PROCEDURE — 85045 AUTOMATED RETICULOCYTE COUNT: CPT | Performed by: FAMILY MEDICINE

## 2022-01-01 PROCEDURE — 74177 CT ABD & PELVIS W/CONTRAST: CPT | Mod: TC

## 2022-01-01 PROCEDURE — 82248 BILIRUBIN DIRECT: CPT | Performed by: FAMILY MEDICINE

## 2022-01-01 PROCEDURE — 83880 ASSAY OF NATRIURETIC PEPTIDE: CPT | Performed by: EMERGENCY MEDICINE

## 2022-01-01 PROCEDURE — 99285 EMERGENCY DEPT VISIT HI MDM: CPT | Mod: 25

## 2022-01-01 PROCEDURE — 85610 PROTHROMBIN TIME: CPT | Performed by: FAMILY MEDICINE

## 2022-01-01 PROCEDURE — 63600175 PHARM REV CODE 636 W HCPCS: Performed by: EMERGENCY MEDICINE

## 2022-01-01 PROCEDURE — 80053 COMPREHEN METABOLIC PANEL: CPT | Performed by: EMERGENCY MEDICINE

## 2022-01-01 PROCEDURE — 99284 EMERGENCY DEPT VISIT MOD MDM: CPT | Performed by: EMERGENCY MEDICINE

## 2022-01-01 PROCEDURE — 84484 ASSAY OF TROPONIN QUANT: CPT | Performed by: EMERGENCY MEDICINE

## 2022-01-01 PROCEDURE — 83540 ASSAY OF IRON: CPT | Performed by: FAMILY MEDICINE

## 2022-01-01 PROCEDURE — 25500020 PHARM REV CODE 255

## 2022-01-01 PROCEDURE — 93010 EKG 12-LEAD: ICD-10-PCS | Mod: 76,,, | Performed by: INTERNAL MEDICINE

## 2022-01-01 PROCEDURE — 85730 THROMBOPLASTIN TIME PARTIAL: CPT | Performed by: FAMILY MEDICINE

## 2022-01-01 PROCEDURE — 93005 ELECTROCARDIOGRAM TRACING: CPT

## 2022-01-01 PROCEDURE — 25000003 PHARM REV CODE 250: Performed by: EMERGENCY MEDICINE

## 2022-01-01 PROCEDURE — 82746 ASSAY OF FOLIC ACID SERUM: CPT | Performed by: FAMILY MEDICINE

## 2022-01-01 RX ORDER — HYDRALAZINE HYDROCHLORIDE 25 MG/1
25 TABLET, FILM COATED ORAL 2 TIMES DAILY
Status: ON HOLD | COMMUNITY
End: 2023-01-01

## 2022-01-01 RX ORDER — ISOSORBIDE DINITRATE 20 MG/1
20 TABLET ORAL 3 TIMES DAILY
Status: ON HOLD | COMMUNITY
End: 2023-01-01

## 2022-01-01 RX ORDER — DILTIAZEM HYDROCHLORIDE 120 MG/1
120 CAPSULE, COATED, EXTENDED RELEASE ORAL
Status: COMPLETED | OUTPATIENT
Start: 2022-01-01 | End: 2022-01-01

## 2022-01-01 RX ORDER — AMLODIPINE BESYLATE 10 MG/1
10 TABLET ORAL DAILY
COMMUNITY
End: 2022-01-01

## 2022-01-01 RX ORDER — TAMSULOSIN HYDROCHLORIDE 0.4 MG/1
CAPSULE ORAL DAILY
Status: ON HOLD | COMMUNITY
End: 2023-01-01

## 2022-01-01 RX ORDER — BACLOFEN 10 MG/1
10 TABLET ORAL
Status: ON HOLD | COMMUNITY
End: 2023-01-01

## 2022-01-01 RX ORDER — ASPIRIN 325 MG
325 TABLET ORAL
Status: COMPLETED | OUTPATIENT
Start: 2022-01-01 | End: 2022-01-01

## 2022-01-01 RX ORDER — ALUMINUM HYDROXIDE, MAGNESIUM HYDROXIDE, AND SIMETHICONE 2400; 240; 2400 MG/30ML; MG/30ML; MG/30ML
SUSPENSION ORAL EVERY 6 HOURS PRN
Status: ON HOLD | COMMUNITY
End: 2023-01-01

## 2022-01-01 RX ORDER — IBUPROFEN 200 MG
400 TABLET ORAL EVERY 8 HOURS PRN
Status: ON HOLD | COMMUNITY
End: 2023-01-01

## 2022-01-01 RX ORDER — BISACODYL 5 MG
5 TABLET, DELAYED RELEASE (ENTERIC COATED) ORAL DAILY PRN
Status: ON HOLD | COMMUNITY
End: 2023-01-01

## 2022-01-01 RX ORDER — DILTIAZEM HYDROCHLORIDE 5 MG/ML
20 INJECTION INTRAVENOUS
Status: COMPLETED | OUTPATIENT
Start: 2022-01-01 | End: 2022-01-01

## 2022-01-01 RX ORDER — SENNOSIDES 8.6 MG/1
1 TABLET ORAL NIGHTLY
Status: ON HOLD | COMMUNITY
End: 2023-01-01

## 2022-01-01 RX ORDER — DILTIAZEM HYDROCHLORIDE 120 MG/1
120 CAPSULE, COATED, EXTENDED RELEASE ORAL DAILY
Qty: 30 CAPSULE | Refills: 0 | Status: SHIPPED | OUTPATIENT
Start: 2022-01-01 | End: 2023-01-01 | Stop reason: SDUPTHER

## 2022-01-01 RX ORDER — FUROSEMIDE 10 MG/ML
20 INJECTION INTRAMUSCULAR; INTRAVENOUS
Status: COMPLETED | OUTPATIENT
Start: 2022-01-01 | End: 2022-01-01

## 2022-01-01 RX ADMIN — IOPAMIDOL 100 ML: 755 INJECTION, SOLUTION INTRAVENOUS at 10:11

## 2022-01-01 RX ADMIN — DILTIAZEM HYDROCHLORIDE 120 MG: 120 CAPSULE, COATED, EXTENDED RELEASE ORAL at 03:12

## 2022-01-01 RX ADMIN — FUROSEMIDE 20 MG: 10 INJECTION, SOLUTION INTRAVENOUS at 04:12

## 2022-01-01 RX ADMIN — ASPIRIN 325 MG ORAL TABLET 325 MG: 325 PILL ORAL at 02:12

## 2022-01-01 RX ADMIN — DILTIAZEM HYDROCHLORIDE 20 MG: 5 INJECTION INTRAVENOUS at 02:12

## 2022-01-16 ENCOUNTER — LAB REQUISITION (OUTPATIENT)
Dept: LAB | Facility: HOSPITAL | Age: 77
End: 2022-01-16
Attending: FAMILY MEDICINE
Payer: MEDICARE

## 2022-01-16 DIAGNOSIS — M79.10 MYALGIA, UNSPECIFIED SITE: ICD-10-CM

## 2022-01-16 DIAGNOSIS — R50.9 FEVER, UNSPECIFIED: ICD-10-CM

## 2022-01-16 LAB
ALBUMIN SERPL BCP-MCNC: 3 G/DL (ref 3.5–5)
ALBUMIN/GLOB SERPL: 0.9 {RATIO}
ALP SERPL-CCNC: 92 U/L (ref 45–115)
ALT SERPL W P-5'-P-CCNC: 11 U/L (ref 16–61)
ANION GAP SERPL CALCULATED.3IONS-SCNC: 12 MMOL/L (ref 7–16)
AST SERPL W P-5'-P-CCNC: 10 U/L (ref 15–37)
BASOPHILS # BLD AUTO: 0.04 K/UL (ref 0–0.2)
BASOPHILS NFR BLD AUTO: 0.3 % (ref 0–1)
BILIRUB SERPL-MCNC: 0.8 MG/DL (ref 0–1.2)
BUN SERPL-MCNC: 17 MG/DL (ref 7–18)
BUN/CREAT SERPL: 14 (ref 6–20)
CALCIUM SERPL-MCNC: 8.7 MG/DL (ref 8.5–10.1)
CHLORIDE SERPL-SCNC: 104 MMOL/L (ref 98–107)
CO2 SERPL-SCNC: 25 MMOL/L (ref 21–32)
CREAT SERPL-MCNC: 1.2 MG/DL (ref 0.7–1.3)
DIFFERENTIAL METHOD BLD: ABNORMAL
EOSINOPHIL # BLD AUTO: 0.22 K/UL (ref 0–0.5)
EOSINOPHIL NFR BLD AUTO: 1.4 % (ref 1–4)
ERYTHROCYTE [DISTWIDTH] IN BLOOD BY AUTOMATED COUNT: 14.3 % (ref 11.5–14.5)
FLUAV AG UPPER RESP QL IA.RAPID: NEGATIVE
FLUBV AG UPPER RESP QL IA.RAPID: NEGATIVE
GLOBULIN SER-MCNC: 3.3 G/DL (ref 2–4)
GLUCOSE SERPL-MCNC: 108 MG/DL (ref 74–106)
HCT VFR BLD AUTO: 44.9 % (ref 40–54)
HGB BLD-MCNC: 14.2 G/DL (ref 13.5–18)
IMM GRANULOCYTES # BLD AUTO: 0.03 K/UL (ref 0–0.04)
IMM GRANULOCYTES NFR BLD: 0.2 % (ref 0–0.4)
LYMPHOCYTES # BLD AUTO: 1.69 K/UL (ref 1–4.8)
LYMPHOCYTES NFR BLD AUTO: 10.8 % (ref 27–41)
MCH RBC QN AUTO: 30.1 PG (ref 27–31)
MCHC RBC AUTO-ENTMCNC: 31.6 G/DL (ref 32–36)
MCV RBC AUTO: 95.3 FL (ref 80–96)
MONOCYTES # BLD AUTO: 1.32 K/UL (ref 0–0.8)
MONOCYTES NFR BLD AUTO: 8.4 % (ref 2–6)
MPC BLD CALC-MCNC: 9.7 FL (ref 9.4–12.4)
NEUTROPHILS # BLD AUTO: 12.4 K/UL (ref 1.8–7.7)
NEUTROPHILS NFR BLD AUTO: 78.9 % (ref 53–65)
PLATELET # BLD AUTO: 171 K/UL (ref 150–400)
POTASSIUM SERPL-SCNC: 3.9 MMOL/L (ref 3.5–5.1)
PROT SERPL-MCNC: 6.3 G/DL (ref 6.4–8.2)
RBC # BLD AUTO: 4.71 M/UL (ref 4.6–6.2)
SODIUM SERPL-SCNC: 137 MMOL/L (ref 136–145)
WBC # BLD AUTO: 15.7 K/UL (ref 4.5–11)

## 2022-01-16 PROCEDURE — 85025 COMPLETE CBC W/AUTO DIFF WBC: CPT | Performed by: FAMILY MEDICINE

## 2022-01-16 PROCEDURE — 87804 INFLUENZA ASSAY W/OPTIC: CPT | Performed by: FAMILY MEDICINE

## 2022-01-16 PROCEDURE — 80053 COMPREHEN METABOLIC PANEL: CPT | Performed by: FAMILY MEDICINE

## 2022-12-21 NOTE — ED TRIAGE NOTES
Pt from local nursing home. Report called with pt having cp and upper gastric pain. Pt arrived stating he was not in pain at this time. Noted heart rate of 146 upon arrival. Pt states hospitals makes him nervous. Pt arrived by EMS

## 2022-12-21 NOTE — ED PROVIDER NOTES
"Encounter Date: 12/21/2022       History     Chief Complaint   Patient presents with    Chest Pain    Tachycardia     Patient sent to the emergency department from the nursing home via EMS with report of "chest pain", EMS reports patient pointed to the epigastric area when asked where his pain was, nursing home reported it as chest pain.  History is obtained from the patient, EMS as well as nursing home staff.  Pain lasted for several minutes and has now resolved.  Patient feels well currently.  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  No new deficits.  Patient has a history of atrial fibrillation and is on Eliquis.    Review of patient's allergies indicates:  No Known Allergies  Past Medical History:   Diagnosis Date    Age-related nuclear cataract     Anemia, unspecified     Benign prostatic hyperplasia without lower urinary tract symptoms     Cerebral infarct     Constipation     Coronary artery disease     Depression     Essential (primary) hypertension     Liver disease, unspecified     Mixed hyperlipidemia     Paroxysmal atrial fibrillation     Presbyopia      Past Surgical History:   Procedure Laterality Date    right hip       History reviewed. No pertinent family history.  Social History     Tobacco Use    Smoking status: Never    Smokeless tobacco: Never   Substance Use Topics    Alcohol use: Not Currently    Drug use: Never     Review of Systems   Constitutional: Negative.    HENT: Negative.     Eyes: Negative.    Respiratory: Negative.     Cardiovascular: Negative.  Negative for chest pain (Reports epigastric pain which has subsequently resolved.).   Gastrointestinal:  Positive for abdominal pain (had epigastric abdominal pain earlier which has resolved, patient reports they were giving him something for constipation at the nursing home.) and constipation. Negative for abdominal distention, blood in stool, diarrhea, nausea and vomiting. "   Genitourinary: Negative.    Musculoskeletal: Negative.    Skin: Negative.    Neurological: Negative.    Psychiatric/Behavioral: Negative.     All other systems reviewed and are negative.    Physical Exam     Initial Vitals [12/21/22 1411]   BP Pulse Resp Temp SpO2   (!) 138/100 (!) 146 16 98.1 °F (36.7 °C) 96 %      MAP       --         Physical Exam    Nursing note and vitals reviewed.  Constitutional: He appears well-developed and well-nourished. He is not diaphoretic. No distress.   HENT:   Head: Normocephalic.   Right Ear: External ear normal.   Left Ear: External ear normal.   Nose: Nose normal.   Mouth/Throat: Oropharynx is clear and moist. No oropharyngeal exudate.   Eyes: Conjunctivae and EOM are normal. Pupils are equal, round, and reactive to light.   Neck: Neck supple. No JVD present.   Normal range of motion.  Cardiovascular:  Normal heart sounds and intact distal pulses. An irregularly irregular rhythm present.   Tachycardia present.         No murmur heard.  Pulmonary/Chest: Breath sounds normal. No stridor. No respiratory distress. He has no wheezes. He has no rhonchi. He has no rales.   Abdominal: Abdomen is soft. Bowel sounds are normal. He exhibits no distension and no mass. There is no abdominal tenderness. There is no rebound and no guarding.   Musculoskeletal:         General: No tenderness or edema.      Cervical back: Normal range of motion and neck supple.      Comments: Patient has normal range of motion of the right upper and lower extremity, has minimally limited range of motion of the left lower extremity and has no range of motion actively on the left upper extremity due to previous CVA, he is at baseline.     Lymphadenopathy:     He has no cervical adenopathy.   Neurological: He is alert and oriented to person, place, and time. No cranial nerve deficit. GCS score is 15. GCS eye subscore is 4. GCS verbal subscore is 5. GCS motor subscore is 6.   Patient has left hemiparesis that  affects the upper extremity more than the lower extremity on the left.   Skin: Skin is warm and dry. Capillary refill takes less than 2 seconds. No rash noted. No erythema. No pallor.   Psychiatric: He has a normal mood and affect. His behavior is normal.       Medical Screening Exam   See Full Note    ED Course   Procedures  Labs Reviewed   COMPREHENSIVE METABOLIC PANEL - Abnormal; Notable for the following components:       Result Value    Sodium 135 (*)     Glucose 140 (*)     Albumin 3.1 (*)     Alk Phos 179 (*)     AST 53 (*)     eGFR 59 (*)     All other components within normal limits   NT-PRO NATRIURETIC PEPTIDE - Abnormal; Notable for the following components:    ProBNP 3,773 (*)     All other components within normal limits   CBC WITH DIFFERENTIAL - Abnormal; Notable for the following components:    Neutrophils % 71.8 (*)     Lymphocytes % 14.8 (*)     Monocytes % 10.6 (*)     Monocytes, Absolute 1.00 (*)     All other components within normal limits   TROPONIN I - Normal   CBC W/ AUTO DIFFERENTIAL    Narrative:     The following orders were created for panel order CBC auto differential.  Procedure                               Abnormality         Status                     ---------                               -----------         ------                     CBC with Differential[[id number]]        Abnormal            Final result                 Please view results for these tests on the individual orders.        ECG Results              Repeat EKG 12-lead (In process)  Result time 12/21/22 15:22:45      In process by Interface, Lab In [facility] (12/21/22 15:22:45)                   Narrative:    Test Reason : I48.91,    Vent. Rate : 089 BPM     Atrial Rate : 394 BPM     P-R Int : 000 ms          QRS Dur : 098 ms      QT Int : 364 ms       P-R-T Axes : 000 078 095 degrees     QTc Int : 442 ms    Atrial fibrillation  Abnormal ECG  When compared with ECG of 21-DEC-2022 14:19,  Vent. rate has decreased BY  52  BPM  T wave inversion no longer evident in Inferior leads    Referred By: [email or web address]   SELF           Confirmed By:                                      EKG 12-lead (In process)  Result time 12/21/22 14:37:33      In process by Interface, Lab In [facility] (12/21/22 14:37:33)                   Narrative:    Test Reason : R07.9,    Vent. Rate : 141 BPM     Atrial Rate : 101 BPM     P-R Int : 000 ms          QRS Dur : 100 ms      QT Int : 306 ms       P-R-T Axes : 000 079 264 degrees     QTc Int : 468 ms    Atrial fibrillation with rapid ventricular response  ST and T wave abnormality, consider inferior ischemia  Abnormal ECG  No previous ECGs available    Referred By: [email or web address]   SELF           Confirmed By:                                   Imaging Results              X-Ray Chest AP Portable (Final result)  Result time 12/21/22 14:37:36      Final result by Zafar Carpio II, MD (12/21/22 14:37:36)                   Impression:      No evidence of acute cardiopulmonary disease.      Electronically signed by: Zafar Carpio  Date:    12/21/2022  Time:    14:37               Narrative:    EXAMINATION:  XR CHEST AP PORTABLE    CLINICAL HISTORY:  Chest Pain;    COMPARISON:  1 March 2017    FINDINGS:  The heart and mediastinum are normal in size and configuration.  The pulmonary vascularity is normal in caliber.  No lung infiltrates, effusions, pneumothorax or other abnormality is demonstrated.                                       Medications   aspirin tablet 325 mg (325 mg Oral Given 12/21/22 1426)   diltiaZEM injection 20 mg (20 mg Intravenous Given 12/21/22 1447)   diltiaZEM 24 hr capsule 120 mg (120 mg Oral Given 12/21/22 1516)   furosemide injection 20 mg (20 mg Intravenous Given 12/21/22 1629)     Medical Decision Making:   Clinical Tests:   Lab Tests: Reviewed  ED Management:  Patient was given IV diltiazem followed by oral diltiazem as well.  Heart rate now controlled and now in sinus rhythm rather than atrial  fibrillation.  Patient to discontinue Norvasc and started on diltiazem and recheck with primary physician in a few days.  Continue Eliquis.                 Clinical Impression:   Final diagnoses:  [R07.9] Chest pain  [I48.91] Atrial fibrillation with tachycardic ventricular rate (Primary)  [I50.9] Chronic congestive heart failure, unspecified heart failure type        ED Disposition Condition    Discharge to Nursing Home Stable          ED Prescriptions       Medication Sig Dispense Start Date End Date Auth. Provider    diltiaZEM (CARDIZEM CD) 120 MG Cp24 Take 1 capsule (120 mg total) by mouth once daily. 30 capsule 12/21/2022 1/20/2023 [place] Choudhary,           Follow-up Information       Follow up With Specialties Details Why Contact Info    Elliott Fournier DO Family Medicine Schedule an appointment as soon as possible for a visit in 2 days To recheck; sooner if worse, not improving, or if any new symptoms. [address] [address] [address] [phone]               North Choudhary DO  12/21/22 1631       North Choudhary,   12/21/22 1639

## 2022-12-21 NOTE — DISCHARGE INSTRUCTIONS
Recheck blood pressure and heart rate with your doctor with change in medication, stop Norvasc, start diltiazem.

## 2023-01-01 ENCOUNTER — LAB REQUISITION (OUTPATIENT)
Dept: LAB | Facility: HOSPITAL | Age: 78
End: 2023-01-01
Attending: FAMILY MEDICINE
Payer: MEDICARE

## 2023-01-01 ENCOUNTER — HOSPITAL ENCOUNTER (INPATIENT)
Facility: HOSPITAL | Age: 78
LOS: 6 days | DRG: 871 | End: 2023-01-31
Attending: INTERNAL MEDICINE | Admitting: HOSPITALIST
Payer: MEDICARE

## 2023-01-01 ENCOUNTER — HOSPITAL ENCOUNTER (EMERGENCY)
Facility: HOSPITAL | Age: 78
Discharge: HOME | End: 2023-01-10
Attending: EMERGENCY MEDICINE
Payer: MEDICARE

## 2023-01-01 ENCOUNTER — HOSPITAL ENCOUNTER (EMERGENCY)
Facility: HOSPITAL | Age: 78
Discharge: OTHER INSTITUTION | End: 2023-01-25
Attending: EMERGENCY MEDICINE
Payer: MEDICARE

## 2023-01-01 VITALS
HEART RATE: 111 BPM | TEMPERATURE: 99 F | OXYGEN SATURATION: 95 % | HEIGHT: 66 IN | BODY MASS INDEX: 25.23 KG/M2 | SYSTOLIC BLOOD PRESSURE: 140 MMHG | TEMPERATURE: 97 F | DIASTOLIC BLOOD PRESSURE: 59 MMHG | HEIGHT: 66 IN | RESPIRATION RATE: 16 BRPM | HEART RATE: 78 BPM | OXYGEN SATURATION: 94 % | RESPIRATION RATE: 22 BRPM | WEIGHT: 157 LBS | DIASTOLIC BLOOD PRESSURE: 51 MMHG | BODY MASS INDEX: 25.23 KG/M2 | SYSTOLIC BLOOD PRESSURE: 111 MMHG | WEIGHT: 157 LBS

## 2023-01-01 VITALS
BODY MASS INDEX: 25.23 KG/M2 | WEIGHT: 157 LBS | SYSTOLIC BLOOD PRESSURE: 83 MMHG | TEMPERATURE: 97 F | DIASTOLIC BLOOD PRESSURE: 35 MMHG | RESPIRATION RATE: 18 BRPM | HEART RATE: 67 BPM | OXYGEN SATURATION: 94 % | HEIGHT: 66 IN

## 2023-01-01 DIAGNOSIS — R07.9 CHEST PAIN: ICD-10-CM

## 2023-01-01 DIAGNOSIS — A41.9 SEPSIS, DUE TO UNSPECIFIED ORGANISM, UNSPECIFIED WHETHER ACUTE ORGAN DYSFUNCTION PRESENT: ICD-10-CM

## 2023-01-01 DIAGNOSIS — C78.7 METASTATIC CANCER TO LIVER: ICD-10-CM

## 2023-01-01 DIAGNOSIS — Q44.6 POLYCYSTIC LIVER DISEASE: ICD-10-CM

## 2023-01-01 DIAGNOSIS — I10 ESSENTIAL (PRIMARY) HYPERTENSION: ICD-10-CM

## 2023-01-01 DIAGNOSIS — D64.9 ANEMIA, UNSPECIFIED: ICD-10-CM

## 2023-01-01 DIAGNOSIS — I48.20 CHRONIC ATRIAL FIBRILLATION, UNSPECIFIED: ICD-10-CM

## 2023-01-01 DIAGNOSIS — R00.0 TACHYCARDIA: ICD-10-CM

## 2023-01-01 DIAGNOSIS — I50.9 CHF (CONGESTIVE HEART FAILURE): ICD-10-CM

## 2023-01-01 DIAGNOSIS — R17 JAUNDICE: ICD-10-CM

## 2023-01-01 DIAGNOSIS — I69.351 HEMIPLEGIA AND HEMIPARESIS FOLLOWING CEREBRAL INFARCTION AFFECTING RIGHT DOMINANT SIDE: ICD-10-CM

## 2023-01-01 DIAGNOSIS — I50.9 CONGESTIVE HEART FAILURE, UNSPECIFIED HF CHRONICITY, UNSPECIFIED HEART FAILURE TYPE: ICD-10-CM

## 2023-01-01 DIAGNOSIS — A41.9: Primary | ICD-10-CM

## 2023-01-01 DIAGNOSIS — I63.9 CEREBRAL INFARCTION, UNSPECIFIED: ICD-10-CM

## 2023-01-01 DIAGNOSIS — R52 PAIN: ICD-10-CM

## 2023-01-01 DIAGNOSIS — N18.32 STAGE 3B CHRONIC KIDNEY DISEASE: ICD-10-CM

## 2023-01-01 DIAGNOSIS — I48.91 ATRIAL FIBRILLATION WITH RVR: Primary | ICD-10-CM

## 2023-01-01 DIAGNOSIS — N40.0 BENIGN PROSTATIC HYPERPLASIA, UNSPECIFIED WHETHER LOWER URINARY TRACT SYMPTOMS PRESENT: Primary | ICD-10-CM

## 2023-01-01 DIAGNOSIS — K72.00: Primary | ICD-10-CM

## 2023-01-01 DIAGNOSIS — K72.90 LIVER FAILURE: ICD-10-CM

## 2023-01-01 DIAGNOSIS — R65.20: Primary | ICD-10-CM

## 2023-01-01 DIAGNOSIS — I48.91 ATRIAL FIBRILLATION WITH RAPID VENTRICULAR RESPONSE: ICD-10-CM

## 2023-01-01 LAB
ALBUMIN SERPL BCP-MCNC: 1.3 G/DL (ref 3.5–5)
ALBUMIN SERPL BCP-MCNC: 1.4 G/DL (ref 3.5–5)
ALBUMIN SERPL BCP-MCNC: 1.5 G/DL (ref 3.5–5)
ALBUMIN SERPL BCP-MCNC: 1.7 G/DL (ref 3.5–5)
ALBUMIN SERPL BCP-MCNC: 1.8 G/DL (ref 3.5–5)
ALBUMIN SERPL BCP-MCNC: 2 G/DL (ref 3.5–5)
ALBUMIN SERPL BCP-MCNC: 2.5 G/DL (ref 3.5–5)
ALBUMIN SERPL BCP-MCNC: 2.5 G/DL (ref 3.5–5)
ALBUMIN/GLOB SERPL: 0.5 {RATIO}
ALBUMIN/GLOB SERPL: 0.6 {RATIO}
ALBUMIN/GLOB SERPL: 0.7 {RATIO}
ALBUMIN/GLOB SERPL: 0.7 {RATIO}
ALP SERPL-CCNC: 172 U/L (ref 45–115)
ALP SERPL-CCNC: 192 U/L (ref 45–115)
ALP SERPL-CCNC: 384 U/L (ref 45–115)
ALP SERPL-CCNC: 431 U/L (ref 45–115)
ALP SERPL-CCNC: 439 U/L (ref 45–115)
ALP SERPL-CCNC: 469 U/L (ref 45–115)
ALP SERPL-CCNC: 476 U/L (ref 45–115)
ALP SERPL-CCNC: 527 U/L (ref 45–115)
ALT SERPL W P-5'-P-CCNC: 102 U/L (ref 16–61)
ALT SERPL W P-5'-P-CCNC: 128 U/L (ref 16–61)
ALT SERPL W P-5'-P-CCNC: 128 U/L (ref 16–61)
ALT SERPL W P-5'-P-CCNC: 157 U/L (ref 16–61)
ALT SERPL W P-5'-P-CCNC: 25 U/L (ref 16–61)
ALT SERPL W P-5'-P-CCNC: 27 U/L (ref 16–61)
ALT SERPL W P-5'-P-CCNC: 92 U/L (ref 16–61)
ALT SERPL W P-5'-P-CCNC: 99 U/L (ref 16–61)
AMMONIA PLAS-SCNC: <10 ΜMOL/L (ref 11–32)
AMYLASE SERPL-CCNC: 57 U/L (ref 25–115)
ANION GAP SERPL CALCULATED.3IONS-SCNC: 12 MMOL/L (ref 7–16)
ANION GAP SERPL CALCULATED.3IONS-SCNC: 13 MMOL/L (ref 7–16)
ANION GAP SERPL CALCULATED.3IONS-SCNC: 17 MMOL/L (ref 7–16)
ANION GAP SERPL CALCULATED.3IONS-SCNC: 19 MMOL/L (ref 7–16)
ANION GAP SERPL CALCULATED.3IONS-SCNC: 19 MMOL/L (ref 7–16)
ANION GAP SERPL CALCULATED.3IONS-SCNC: 20 MMOL/L (ref 7–16)
ANION GAP SERPL CALCULATED.3IONS-SCNC: 23 MMOL/L (ref 7–16)
ANION GAP SERPL CALCULATED.3IONS-SCNC: 24 MMOL/L (ref 7–16)
ANISOCYTOSIS BLD QL SMEAR: ABNORMAL
AORTIC ROOT ANNULUS: 3.1 CM
AORTIC VALVE CUSP SEPERATION: 1.88 CM
APTT PPP: 29.4 SECONDS (ref 25.2–37.3)
APTT PPP: 33.3 SECONDS (ref 25.2–37.3)
AST SERPL W P-5'-P-CCNC: 1000 U/L (ref 15–37)
AST SERPL W P-5'-P-CCNC: 284 U/L (ref 15–37)
AST SERPL W P-5'-P-CCNC: 297 U/L (ref 15–37)
AST SERPL W P-5'-P-CCNC: 340 U/L (ref 15–37)
AST SERPL W P-5'-P-CCNC: 571 U/L (ref 15–37)
AST SERPL W P-5'-P-CCNC: 654 U/L (ref 15–37)
AST SERPL W P-5'-P-CCNC: 73 U/L (ref 15–37)
AST SERPL W P-5'-P-CCNC: 84 U/L (ref 15–37)
AV INDEX (PROSTH): 0.64
AV MEAN GRADIENT: 1 MMHG
AV PEAK GRADIENT: 2 MMHG
AV REGURGITATION PRESSURE HALF TIME: 513 MS
AV VALVE AREA: 1.62 CM2
AV VELOCITY RATIO: 0.71
BACTERIA #/AREA URNS HPF: ABNORMAL /HPF
BACTERIA BLD CULT: NORMAL
BACTERIA BLD CULT: NORMAL
BACTERIA SPEC ANAEROBE CULT: NORMAL
BACTERIA SPEC BFLD CULT: NORMAL
BASOPHILS # BLD AUTO: 0.03 K/UL (ref 0–0.2)
BASOPHILS # BLD AUTO: 0.04 K/UL (ref 0–0.2)
BASOPHILS # BLD AUTO: 0.05 K/UL (ref 0–0.2)
BASOPHILS # BLD AUTO: 0.05 K/UL (ref 0–0.2)
BASOPHILS NFR BLD AUTO: 0.1 % (ref 0–1)
BASOPHILS NFR BLD AUTO: 0.2 % (ref 0–1)
BASOPHILS NFR BLD AUTO: 0.5 % (ref 0–1)
BASOPHILS NFR BLD AUTO: 0.5 % (ref 0–1)
BILIRUB DIRECT SERPL-MCNC: 11.4 MG/DL (ref 0–0.2)
BILIRUB DIRECT SERPL-MCNC: 11.4 MG/DL (ref 0–0.2)
BILIRUB DIRECT SERPL-MCNC: 8 MG/DL (ref 0–0.2)
BILIRUB DIRECT SERPL-MCNC: 9.9 MG/DL (ref 0–0.2)
BILIRUB SERPL-MCNC: 0.6 MG/DL (ref ?–1.2)
BILIRUB SERPL-MCNC: 0.7 MG/DL (ref ?–1.2)
BILIRUB SERPL-MCNC: 11.1 MG/DL (ref ?–1.2)
BILIRUB SERPL-MCNC: 13 MG/DL (ref ?–1.2)
BILIRUB SERPL-MCNC: 14.1 MG/DL (ref ?–1.2)
BILIRUB SERPL-MCNC: 15.1 MG/DL (ref ?–1.2)
BILIRUB SERPL-MCNC: 8.5 MG/DL (ref ?–1.2)
BILIRUB SERPL-MCNC: 8.6 MG/DL (ref ?–1.2)
BILIRUB SERPL-MCNC: 9.8 MG/DL (ref ?–1.2)
BILIRUB UR QL STRIP: ABNORMAL
BSA FOR ECHO PROCEDURE: 1.82 M2
BUN SERPL-MCNC: 12 MG/DL (ref 7–18)
BUN SERPL-MCNC: 13 MG/DL (ref 7–18)
BUN SERPL-MCNC: 61 MG/DL (ref 7–18)
BUN SERPL-MCNC: 64 MG/DL (ref 7–18)
BUN SERPL-MCNC: 71 MG/DL (ref 7–18)
BUN SERPL-MCNC: 76 MG/DL (ref 7–18)
BUN SERPL-MCNC: 79 MG/DL (ref 7–18)
BUN SERPL-MCNC: 93 MG/DL (ref 7–18)
BUN/CREAT SERPL: 10 (ref 6–20)
BUN/CREAT SERPL: 10 (ref 6–20)
BUN/CREAT SERPL: 21 (ref 6–20)
BUN/CREAT SERPL: 23 (ref 6–20)
BUN/CREAT SERPL: 25 (ref 6–20)
BUN/CREAT SERPL: 27 (ref 6–20)
BUN/CREAT SERPL: 30 (ref 6–20)
BUN/CREAT SERPL: 33 (ref 6–20)
BURR CELLS BLD QL SMEAR: ABNORMAL
CALCIUM SERPL-MCNC: 7 MG/DL (ref 8.5–10.1)
CALCIUM SERPL-MCNC: 7.3 MG/DL (ref 8.5–10.1)
CALCIUM SERPL-MCNC: 7.4 MG/DL (ref 8.5–10.1)
CALCIUM SERPL-MCNC: 7.5 MG/DL (ref 8.5–10.1)
CALCIUM SERPL-MCNC: 7.6 MG/DL (ref 8.5–10.1)
CALCIUM SERPL-MCNC: 7.9 MG/DL (ref 8.5–10.1)
CALCIUM SERPL-MCNC: 8 MG/DL (ref 8.5–10.1)
CALCIUM SERPL-MCNC: 8.1 MG/DL (ref 8.5–10.1)
CHLORIDE SERPL-SCNC: 100 MMOL/L (ref 98–107)
CHLORIDE SERPL-SCNC: 104 MMOL/L (ref 98–107)
CHLORIDE SERPL-SCNC: 105 MMOL/L (ref 98–107)
CHLORIDE SERPL-SCNC: 107 MMOL/L (ref 98–107)
CHLORIDE SERPL-SCNC: 107 MMOL/L (ref 98–107)
CHLORIDE SERPL-SCNC: 92 MMOL/L (ref 98–107)
CHLORIDE SERPL-SCNC: 95 MMOL/L (ref 98–107)
CHLORIDE SERPL-SCNC: 99 MMOL/L (ref 98–107)
CLARITY FLD: CLEAR
CLARITY UR: CLEAR
CO2 SERPL-SCNC: 15 MMOL/L (ref 21–32)
CO2 SERPL-SCNC: 16 MMOL/L (ref 21–32)
CO2 SERPL-SCNC: 17 MMOL/L (ref 21–32)
CO2 SERPL-SCNC: 18 MMOL/L (ref 21–32)
CO2 SERPL-SCNC: 18 MMOL/L (ref 21–32)
CO2 SERPL-SCNC: 21 MMOL/L (ref 21–32)
CO2 SERPL-SCNC: 24 MMOL/L (ref 21–32)
CO2 SERPL-SCNC: 25 MMOL/L (ref 21–32)
COLOR FLD: ABNORMAL
COLOR UR: YELLOW
CREAT SERPL-MCNC: 1.25 MG/DL (ref 0.7–1.3)
CREAT SERPL-MCNC: 1.28 MG/DL (ref 0.7–1.3)
CREAT SERPL-MCNC: 1.96 MG/DL (ref 0.7–1.3)
CREAT SERPL-MCNC: 2.04 MG/DL (ref 0.7–1.3)
CREAT SERPL-MCNC: 2.6 MG/DL (ref 0.7–1.3)
CREAT SERPL-MCNC: 3.02 MG/DL (ref 0.7–1.3)
CREAT SERPL-MCNC: 3.51 MG/DL (ref 0.7–1.3)
CREAT SERPL-MCNC: 4.35 MG/DL (ref 0.7–1.3)
CV ECHO LV RWT: 0.41 CM
DIFFERENTIAL METHOD BLD: ABNORMAL
DOP CALC AO PEAK VEL: 0.7 M/S
DOP CALC AO VTI: 11 CM
DOP CALC LVOT AREA: 2.5 CM2
DOP CALC LVOT DIAMETER: 1.8 CM
DOP CALC LVOT PEAK VEL: 0.5 M/S
DOP CALC LVOT STROKE VOLUME: 17.8 CM3
DOP CALCLVOT PEAK VEL VTI: 7 CM
E WAVE DECELERATION TIME: 150 MSEC
ECHO EF ESTIMATED: 50 %
ECHO LV POSTERIOR WALL: 0.86 CM (ref 0.6–1.1)
EGFR (NO RACE VARIABLE) (RUSH/TITUS): 13 ML/MIN/1.73M²
EGFR (NO RACE VARIABLE) (RUSH/TITUS): 17 ML/MIN/1.73M²
EGFR (NO RACE VARIABLE) (RUSH/TITUS): 21 ML/MIN/1.73M²
EGFR (NO RACE VARIABLE) (RUSH/TITUS): 25 ML/MIN/1.73M²
EGFR (NO RACE VARIABLE) (RUSH/TITUS): 33 ML/MIN/1.73M²
EGFR (NO RACE VARIABLE) (RUSH/TITUS): 35 ML/MIN/1.73M²
EGFR (NO RACE VARIABLE) (RUSH/TITUS): 58 ML/MIN/1.73M²
EGFR (NO RACE VARIABLE) (RUSH/TITUS): 60 ML/MIN/1.73M²
EJECTION FRACTION: 55 %
EOSINOPHIL # BLD AUTO: 0 K/UL (ref 0–0.5)
EOSINOPHIL # BLD AUTO: 0 K/UL (ref 0–0.5)
EOSINOPHIL # BLD AUTO: 0.01 K/UL (ref 0–0.5)
EOSINOPHIL # BLD AUTO: 0.02 K/UL (ref 0–0.5)
EOSINOPHIL # BLD AUTO: 0.03 K/UL (ref 0–0.5)
EOSINOPHIL # BLD AUTO: 0.09 K/UL (ref 0–0.5)
EOSINOPHIL # BLD AUTO: 0.22 K/UL (ref 0–0.5)
EOSINOPHIL # BLD AUTO: 0.25 K/UL (ref 0–0.5)
EOSINOPHIL NFR BLD AUTO: 0 % (ref 1–4)
EOSINOPHIL NFR BLD AUTO: 0.1 % (ref 1–4)
EOSINOPHIL NFR BLD AUTO: 0.2 % (ref 1–4)
EOSINOPHIL NFR BLD AUTO: 0.5 % (ref 1–4)
EOSINOPHIL NFR BLD AUTO: 2.6 % (ref 1–4)
EOSINOPHIL NFR BLD AUTO: 2.9 % (ref 1–4)
ERYTHROCYTE [DISTWIDTH] IN BLOOD BY AUTOMATED COUNT: 13.7 % (ref 11.5–14.5)
ERYTHROCYTE [DISTWIDTH] IN BLOOD BY AUTOMATED COUNT: 13.9 % (ref 11.5–14.5)
ERYTHROCYTE [DISTWIDTH] IN BLOOD BY AUTOMATED COUNT: 16.7 % (ref 11.5–14.5)
ERYTHROCYTE [DISTWIDTH] IN BLOOD BY AUTOMATED COUNT: 16.8 % (ref 11.5–14.5)
ERYTHROCYTE [DISTWIDTH] IN BLOOD BY AUTOMATED COUNT: 17.6 % (ref 11.5–14.5)
ERYTHROCYTE [DISTWIDTH] IN BLOOD BY AUTOMATED COUNT: 17.9 % (ref 11.5–14.5)
ERYTHROCYTE [DISTWIDTH] IN BLOOD BY AUTOMATED COUNT: 18.5 % (ref 11.5–14.5)
ERYTHROCYTE [DISTWIDTH] IN BLOOD BY AUTOMATED COUNT: 18.7 % (ref 11.5–14.5)
FLUAV AG UPPER RESP QL IA.RAPID: NEGATIVE
FLUBV AG UPPER RESP QL IA.RAPID: NEGATIVE
FRACTIONAL SHORTENING: 33 % (ref 28–44)
GLOBULIN SER-MCNC: 2.4 G/DL (ref 2–4)
GLOBULIN SER-MCNC: 2.7 G/DL (ref 2–4)
GLOBULIN SER-MCNC: 2.8 G/DL (ref 2–4)
GLOBULIN SER-MCNC: 3.1 G/DL (ref 2–4)
GLOBULIN SER-MCNC: 3.1 G/DL (ref 2–4)
GLOBULIN SER-MCNC: 3.3 G/DL (ref 2–4)
GLOBULIN SER-MCNC: 3.4 G/DL (ref 2–4)
GLOBULIN SER-MCNC: 3.4 G/DL (ref 2–4)
GLUCOSE SERPL-MCNC: 100 MG/DL (ref 70–105)
GLUCOSE SERPL-MCNC: 103 MG/DL (ref 74–106)
GLUCOSE SERPL-MCNC: 103 MG/DL (ref 74–106)
GLUCOSE SERPL-MCNC: 104 MG/DL (ref 70–105)
GLUCOSE SERPL-MCNC: 105 MG/DL (ref 70–105)
GLUCOSE SERPL-MCNC: 111 MG/DL (ref 70–105)
GLUCOSE SERPL-MCNC: 116 MG/DL (ref 70–105)
GLUCOSE SERPL-MCNC: 121 MG/DL (ref 70–105)
GLUCOSE SERPL-MCNC: 122 MG/DL (ref 70–105)
GLUCOSE SERPL-MCNC: 127 MG/DL (ref 70–105)
GLUCOSE SERPL-MCNC: 66 MG/DL (ref 74–106)
GLUCOSE SERPL-MCNC: 67 MG/DL (ref 70–105)
GLUCOSE SERPL-MCNC: 74 MG/DL (ref 70–105)
GLUCOSE SERPL-MCNC: 76 MG/DL (ref 74–106)
GLUCOSE SERPL-MCNC: 81 MG/DL (ref 70–105)
GLUCOSE SERPL-MCNC: 82 MG/DL (ref 74–106)
GLUCOSE SERPL-MCNC: 83 MG/DL (ref 74–106)
GLUCOSE SERPL-MCNC: 84 MG/DL (ref 70–105)
GLUCOSE SERPL-MCNC: 86 MG/DL (ref 70–105)
GLUCOSE SERPL-MCNC: 90 MG/DL (ref 70–105)
GLUCOSE SERPL-MCNC: 90 MG/DL (ref 74–106)
GLUCOSE SERPL-MCNC: 92 MG/DL (ref 70–105)
GLUCOSE SERPL-MCNC: 95 MG/DL (ref 74–106)
GLUCOSE UR STRIP-MCNC: 100 MG/DL
GRANULOCYTES NFR FLD MANUAL: 9 % (ref 0–25)
HCT VFR BLD AUTO: 34.8 % (ref 40–54)
HCT VFR BLD AUTO: 36.4 % (ref 40–54)
HCT VFR BLD AUTO: 37.9 % (ref 40–54)
HCT VFR BLD AUTO: 39.2 % (ref 40–54)
HCT VFR BLD AUTO: 40.3 % (ref 40–54)
HCT VFR BLD AUTO: 40.3 % (ref 40–54)
HCT VFR BLD AUTO: 42.4 % (ref 40–54)
HCT VFR BLD AUTO: 44.8 % (ref 40–54)
HGB BLD-MCNC: 12.3 G/DL (ref 13.5–18)
HGB BLD-MCNC: 12.6 G/DL (ref 13.5–18)
HGB BLD-MCNC: 12.6 G/DL (ref 13.5–18)
HGB BLD-MCNC: 12.7 G/DL (ref 13.5–18)
HGB BLD-MCNC: 12.9 G/DL (ref 13.5–18)
HGB BLD-MCNC: 13.3 G/DL (ref 13.5–18)
HGB BLD-MCNC: 13.7 G/DL (ref 13.5–18)
HGB BLD-MCNC: 14.6 G/DL (ref 13.5–18)
HYPOCHROMIA BLD QL SMEAR: ABNORMAL
IMM GRANULOCYTES # BLD AUTO: 0.02 K/UL (ref 0–0.04)
IMM GRANULOCYTES # BLD AUTO: 0.03 K/UL (ref 0–0.04)
IMM GRANULOCYTES # BLD AUTO: 0.22 K/UL (ref 0–0.04)
IMM GRANULOCYTES # BLD AUTO: 0.32 K/UL (ref 0–0.04)
IMM GRANULOCYTES # BLD AUTO: 0.35 K/UL (ref 0–0.04)
IMM GRANULOCYTES # BLD AUTO: 0.45 K/UL (ref 0–0.04)
IMM GRANULOCYTES # BLD AUTO: 0.51 K/UL (ref 0–0.04)
IMM GRANULOCYTES # BLD AUTO: 0.64 K/UL (ref 0–0.04)
IMM GRANULOCYTES NFR BLD: 0.2 % (ref 0–0.4)
IMM GRANULOCYTES NFR BLD: 0.3 % (ref 0–0.4)
IMM GRANULOCYTES NFR BLD: 1 % (ref 0–0.4)
IMM GRANULOCYTES NFR BLD: 1.6 % (ref 0–0.4)
IMM GRANULOCYTES NFR BLD: 1.8 % (ref 0–0.4)
IMM GRANULOCYTES NFR BLD: 2 % (ref 0–0.4)
IMM GRANULOCYTES NFR BLD: 2.4 % (ref 0–0.4)
IMM GRANULOCYTES NFR BLD: 2.8 % (ref 0–0.4)
INR BLD: 1.51
INR BLD: 1.52
INSULIN SERPL-ACNC: NORMAL U[IU]/ML
INTERVENTRICULAR SEPTUM: 1 CM (ref 0.6–1.1)
IVC OSTIUM: 0.8 CM
KETONES UR STRIP-SCNC: 15 MG/DL
LAB AP CLINICAL INFORMATION: NORMAL
LAB AP COMMENTS: NORMAL
LAB AP GROSS DESCRIPTION: NORMAL
LAB AP LABORATORY NOTES: NORMAL
LAB AP SPECIMEN A NON-GYN GENERAL CATEGORIZATION: NORMAL
LAB AP SPECIMEN A NON-GYN INTERPETATION: NORMAL
LACTATE SERPL-SCNC: 1.8 MMOL/L (ref 0.4–2)
LACTATE SERPL-SCNC: 2.2 MMOL/L (ref 0.4–2)
LACTATE SERPL-SCNC: 2.5 MMOL/L (ref 0.4–2)
LDH FLD-CCNC: 273 U/L
LDH SERPL-CCNC: >4000 U/L (ref 87–241)
LEFT ATRIUM SIZE: 3.2 CM
LEFT INTERNAL DIMENSION IN SYSTOLE: 2.84 CM (ref 2.1–4)
LEFT VENTRICLE DIASTOLIC VOLUME INDEX: 44.67 ML/M2
LEFT VENTRICLE DIASTOLIC VOLUME: 80.4 ML
LEFT VENTRICLE MASS INDEX: 70 G/M2
LEFT VENTRICLE SYSTOLIC VOLUME INDEX: 17 ML/M2
LEFT VENTRICLE SYSTOLIC VOLUME: 30.6 ML
LEFT VENTRICULAR INTERNAL DIMENSION IN DIASTOLE: 4.24 CM (ref 3.5–6)
LEFT VENTRICULAR MASS: 126.03 G
LEUKOCYTE ESTERASE UR QL STRIP: ABNORMAL
LIPASE SERPL-CCNC: 177 U/L (ref 73–393)
LVOT MG: 1 MMHG
LYMPHOCYTES # BLD AUTO: 0.31 K/UL (ref 1–4.8)
LYMPHOCYTES # BLD AUTO: 0.37 K/UL (ref 1–4.8)
LYMPHOCYTES # BLD AUTO: 0.43 K/UL (ref 1–4.8)
LYMPHOCYTES # BLD AUTO: 0.5 K/UL (ref 1–4.8)
LYMPHOCYTES # BLD AUTO: 0.75 K/UL (ref 1–4.8)
LYMPHOCYTES # BLD AUTO: 0.79 K/UL (ref 1–4.8)
LYMPHOCYTES # BLD AUTO: 1.23 K/UL (ref 1–4.8)
LYMPHOCYTES # BLD AUTO: 1.52 K/UL (ref 1–4.8)
LYMPHOCYTES NFR BLD AUTO: 1.5 % (ref 27–41)
LYMPHOCYTES NFR BLD AUTO: 1.9 % (ref 27–41)
LYMPHOCYTES NFR BLD AUTO: 1.9 % (ref 27–41)
LYMPHOCYTES NFR BLD AUTO: 14.5 % (ref 27–41)
LYMPHOCYTES NFR BLD AUTO: 17.5 % (ref 27–41)
LYMPHOCYTES NFR BLD AUTO: 2.2 % (ref 27–41)
LYMPHOCYTES NFR BLD AUTO: 3.3 % (ref 27–41)
LYMPHOCYTES NFR BLD AUTO: 4 % (ref 27–41)
LYMPHOCYTES NFR BLD MANUAL: 2 % (ref 27–41)
LYMPHOCYTES NFR BLD MANUAL: 3 % (ref 27–41)
LYMPHOCYTES NFR BLD MANUAL: 6 % (ref 27–41)
LYMPHOCYTES NFR FLD MANUAL: 72 %
MAGNESIUM SERPL-MCNC: 2.5 MG/DL (ref 1.7–2.3)
MAGNESIUM SERPL-MCNC: 2.7 MG/DL (ref 1.7–2.3)
MCH RBC QN AUTO: 29.7 PG (ref 27–31)
MCH RBC QN AUTO: 30 PG (ref 27–31)
MCH RBC QN AUTO: 30.1 PG (ref 27–31)
MCH RBC QN AUTO: 30.4 PG (ref 27–31)
MCH RBC QN AUTO: 30.4 PG (ref 27–31)
MCH RBC QN AUTO: 30.8 PG (ref 27–31)
MCHC RBC AUTO-ENTMCNC: 31.3 G/DL (ref 32–36)
MCHC RBC AUTO-ENTMCNC: 31.5 G/DL (ref 32–36)
MCHC RBC AUTO-ENTMCNC: 32.3 G/DL (ref 32–36)
MCHC RBC AUTO-ENTMCNC: 32.6 G/DL (ref 32–36)
MCHC RBC AUTO-ENTMCNC: 33.9 G/DL (ref 32–36)
MCHC RBC AUTO-ENTMCNC: 34 G/DL (ref 32–36)
MCHC RBC AUTO-ENTMCNC: 34.6 G/DL (ref 32–36)
MCHC RBC AUTO-ENTMCNC: 35.3 G/DL (ref 32–36)
MCV RBC AUTO: 85.3 FL (ref 80–96)
MCV RBC AUTO: 87.7 FL (ref 80–96)
MCV RBC AUTO: 88.1 FL (ref 80–96)
MCV RBC AUTO: 88.3 FL (ref 80–96)
MCV RBC AUTO: 91.2 FL (ref 80–96)
MCV RBC AUTO: 92.8 FL (ref 80–96)
MCV RBC AUTO: 97.3 FL (ref 80–96)
MCV RBC AUTO: 97.8 FL (ref 80–96)
MONOCYTES # BLD AUTO: 0.88 K/UL (ref 0–0.8)
MONOCYTES # BLD AUTO: 0.9 K/UL (ref 0–0.8)
MONOCYTES # BLD AUTO: 1.09 K/UL (ref 0–0.8)
MONOCYTES # BLD AUTO: 1.29 K/UL (ref 0–0.8)
MONOCYTES # BLD AUTO: 1.44 K/UL (ref 0–0.8)
MONOCYTES # BLD AUTO: 1.49 K/UL (ref 0–0.8)
MONOCYTES # BLD AUTO: 1.61 K/UL (ref 0–0.8)
MONOCYTES # BLD AUTO: 1.66 K/UL (ref 0–0.8)
MONOCYTES NFR BLD AUTO: 10.3 % (ref 2–6)
MONOCYTES NFR BLD AUTO: 10.4 % (ref 2–6)
MONOCYTES NFR BLD AUTO: 5.6 % (ref 2–6)
MONOCYTES NFR BLD AUTO: 5.8 % (ref 2–6)
MONOCYTES NFR BLD AUTO: 6.4 % (ref 2–6)
MONOCYTES NFR BLD AUTO: 6.6 % (ref 2–6)
MONOCYTES NFR BLD AUTO: 7.7 % (ref 2–6)
MONOCYTES NFR BLD AUTO: 8.7 % (ref 2–6)
MONOCYTES NFR BLD MANUAL: 4 % (ref 2–6)
MONOCYTES NFR BLD MANUAL: 5 % (ref 2–6)
MONOCYTES NFR BLD MANUAL: 6 % (ref 2–6)
MONOCYTES NFR BLD MANUAL: 7 % (ref 2–6)
MONOCYTES NFR BLD MANUAL: 7 % (ref 2–6)
MONOCYTES NFR FLD MANUAL: 19 %
MPC BLD CALC-MCNC: 10.2 FL (ref 9.4–12.4)
MPC BLD CALC-MCNC: 10.9 FL (ref 9.4–12.4)
MPC BLD CALC-MCNC: 11.2 FL (ref 9.4–12.4)
MPC BLD CALC-MCNC: 11.2 FL (ref 9.4–12.4)
MPC BLD CALC-MCNC: 11.9 FL (ref 9.4–12.4)
MPC BLD CALC-MCNC: 9.9 FL (ref 9.4–12.4)
MV PEAK E VEL: 0.75 M/S
NEUTROPHILS # BLD AUTO: 16.57 K/UL (ref 1.8–7.7)
NEUTROPHILS # BLD AUTO: 17.29 K/UL (ref 1.8–7.7)
NEUTROPHILS # BLD AUTO: 18.35 K/UL (ref 1.8–7.7)
NEUTROPHILS # BLD AUTO: 19.77 K/UL (ref 1.8–7.7)
NEUTROPHILS # BLD AUTO: 20.06 K/UL (ref 1.8–7.7)
NEUTROPHILS # BLD AUTO: 20.25 K/UL (ref 1.8–7.7)
NEUTROPHILS # BLD AUTO: 5.96 K/UL (ref 1.8–7.7)
NEUTROPHILS # BLD AUTO: 6.09 K/UL (ref 1.8–7.7)
NEUTROPHILS NFR BLD AUTO: 68.5 % (ref 53–65)
NEUTROPHILS NFR BLD AUTO: 71.8 % (ref 53–65)
NEUTROPHILS NFR BLD AUTO: 86.9 % (ref 53–65)
NEUTROPHILS NFR BLD AUTO: 87.1 % (ref 53–65)
NEUTROPHILS NFR BLD AUTO: 88.2 % (ref 53–65)
NEUTROPHILS NFR BLD AUTO: 88.4 % (ref 53–65)
NEUTROPHILS NFR BLD AUTO: 89.1 % (ref 53–65)
NEUTROPHILS NFR BLD AUTO: 91.2 % (ref 53–65)
NEUTS BAND NFR BLD MANUAL: 1 % (ref 1–5)
NEUTS BAND NFR BLD MANUAL: 3 % (ref 1–5)
NEUTS BAND NFR BLD MANUAL: 4 % (ref 1–5)
NEUTS BAND NFR BLD MANUAL: 4 % (ref 1–5)
NEUTS BAND NFR BLD MANUAL: 7 % (ref 1–5)
NEUTS SEG NFR BLD MANUAL: 84 % (ref 50–62)
NEUTS SEG NFR BLD MANUAL: 87 % (ref 50–62)
NEUTS SEG NFR BLD MANUAL: 87 % (ref 50–62)
NEUTS SEG NFR BLD MANUAL: 89 % (ref 50–62)
NEUTS SEG NFR BLD MANUAL: 90 % (ref 50–62)
NITRITE UR QL STRIP: NEGATIVE
NRBC # BLD AUTO: 0 X10E3/UL
NRBC BLD MANUAL-RTO: ABNORMAL %
NRBC, AUTO (.00): 0 %
NT-PROBNP SERPL-MCNC: 5359 PG/ML (ref 1–450)
NT-PROBNP SERPL-MCNC: 8730 PG/ML (ref 1–450)
OVALOCYTES BLD QL SMEAR: ABNORMAL
PH UR STRIP: 5 PH UNITS
PISA AR MAX VEL: 2.2 M/S
PISA TR MAX VEL: 2.4 M/S
PLATELET # BLD AUTO: 120 K/UL (ref 150–400)
PLATELET # BLD AUTO: 137 K/UL (ref 150–400)
PLATELET # BLD AUTO: 169 K/UL (ref 150–400)
PLATELET # BLD AUTO: 206 K/UL (ref 150–400)
PLATELET # BLD AUTO: 217 K/UL (ref 150–400)
PLATELET # BLD AUTO: 81 K/UL (ref 150–400)
PLATELET # BLD AUTO: 94 K/UL (ref 150–400)
PLATELET # BLD AUTO: 98 K/UL (ref 150–400)
PLATELET MORPHOLOGY: ABNORMAL
PLATELET MORPHOLOGY: NORMAL
POTASSIUM SERPL-SCNC: 4 MMOL/L (ref 3.5–5.1)
POTASSIUM SERPL-SCNC: 4.3 MMOL/L (ref 3.5–5.1)
POTASSIUM SERPL-SCNC: 4.4 MMOL/L (ref 3.5–5.1)
POTASSIUM SERPL-SCNC: 4.5 MMOL/L (ref 3.5–5.1)
POTASSIUM SERPL-SCNC: 5.1 MMOL/L (ref 3.5–5.1)
POTASSIUM SERPL-SCNC: 5.1 MMOL/L (ref 3.5–5.1)
POTASSIUM SERPL-SCNC: 5.2 MMOL/L (ref 3.5–5.1)
POTASSIUM SERPL-SCNC: 5.3 MMOL/L (ref 3.5–5.1)
PROT FLD-MCNC: 1 G/DL
PROT SERPL-MCNC: 3.8 G/DL (ref 6.4–8.2)
PROT SERPL-MCNC: 4 G/DL (ref 6.4–8.2)
PROT SERPL-MCNC: 4.4 G/DL (ref 6.4–8.2)
PROT SERPL-MCNC: 4.6 G/DL (ref 6.4–8.2)
PROT SERPL-MCNC: 4.6 G/DL (ref 6.4–8.2)
PROT SERPL-MCNC: 4.8 G/DL (ref 6.4–8.2)
PROT SERPL-MCNC: 5.3 G/DL (ref 6.4–8.2)
PROT SERPL-MCNC: 5.9 G/DL (ref 6.4–8.2)
PROT SERPL-MCNC: 5.9 G/DL (ref 6.4–8.2)
PROT UR QL STRIP: NEGATIVE
PROTHROMBIN TIME: 17.7 SECONDS (ref 11.7–14.7)
PROTHROMBIN TIME: 18.1 SECONDS (ref 11.7–14.7)
RA MAJOR: 3.2 CM
RA PRESSURE: 8 MMHG
RBC # BLD AUTO: 4.08 M/UL (ref 4.6–6.2)
RBC # BLD AUTO: 4.12 M/UL (ref 4.6–6.2)
RBC # BLD AUTO: 4.14 M/UL (ref 4.6–6.2)
RBC # BLD AUTO: 4.15 M/UL (ref 4.6–6.2)
RBC # BLD AUTO: 4.3 M/UL (ref 4.6–6.2)
RBC # BLD AUTO: 4.44 M/UL (ref 4.6–6.2)
RBC # BLD AUTO: 4.57 M/UL (ref 4.6–6.2)
RBC # BLD AUTO: 4.91 M/UL (ref 4.6–6.2)
RBC # FLD MANUAL: <3000 /CUMM
RBC # UR STRIP: ABNORMAL /UL
RBC #/AREA URNS HPF: ABNORMAL /HPF
RBC MORPH BLD: NORMAL
RIGHT VENTRICULAR END-DIASTOLIC DIMENSION: 2.8 CM
SMUDGE CELLS BLD QL SMEAR: ABNORMAL
SODIUM SERPL-SCNC: 126 MMOL/L (ref 136–145)
SODIUM SERPL-SCNC: 129 MMOL/L (ref 136–145)
SODIUM SERPL-SCNC: 130 MMOL/L (ref 136–145)
SODIUM SERPL-SCNC: 132 MMOL/L (ref 136–145)
SODIUM SERPL-SCNC: 136 MMOL/L (ref 136–145)
SODIUM SERPL-SCNC: 139 MMOL/L (ref 136–145)
SODIUM SERPL-SCNC: 140 MMOL/L (ref 136–145)
SODIUM SERPL-SCNC: 141 MMOL/L (ref 136–145)
SP GR UR STRIP: 1.02
SQUAMOUS #/AREA URNS LPF: ABNORMAL /LPF
TARGETS BLD QL SMEAR: ABNORMAL
TR MAX PG: 23 MMHG
TRICUSPID ANNULAR PLANE SYSTOLIC EXCURSION: 1.4 CM
TROPONIN I SERPL HS-MCNC: 17.6 PG/ML
TROPONIN I SERPL HS-MCNC: 31.8 PG/ML
TSH SERPL DL<=0.005 MIU/L-ACNC: 2.2 UIU/ML (ref 0.36–3.74)
TV REST PULMONARY ARTERY PRESSURE: 31 MMHG
UA COMPLETE W REFLEX CULTURE PNL UR: ABNORMAL
UROBILINOGEN UR STRIP-ACNC: 2 MG/DL
WBC # BLD AUTO: 19.07 K/UL (ref 4.5–11)
WBC # BLD AUTO: 19.55 K/UL (ref 4.5–11)
WBC # BLD AUTO: 20.84 K/UL (ref 4.5–11)
WBC # BLD AUTO: 22.22 K/UL (ref 4.5–11)
WBC # BLD AUTO: 22.52 K/UL (ref 4.5–11)
WBC # BLD AUTO: 22.69 K/UL (ref 4.5–11)
WBC # BLD AUTO: 8.48 K/UL (ref 4.5–11)
WBC # BLD AUTO: 8.7 K/UL (ref 4.5–11)
WBC # FLD MANUAL: 209 /CUMM
WBC #/AREA URNS HPF: ABNORMAL /HPF

## 2023-01-01 PROCEDURE — 83735 ASSAY OF MAGNESIUM: CPT | Performed by: EMERGENCY MEDICINE

## 2023-01-01 PROCEDURE — 99284 EMERGENCY DEPT VISIT MOD MDM: CPT | Mod: 25

## 2023-01-01 PROCEDURE — 85025 COMPLETE CBC W/AUTO DIFF WBC: CPT | Performed by: FAMILY MEDICINE

## 2023-01-01 PROCEDURE — 25000003 PHARM REV CODE 250: Performed by: HOSPITALIST

## 2023-01-01 PROCEDURE — 11000001 HC ACUTE MED/SURG PRIVATE ROOM

## 2023-01-01 PROCEDURE — 99232 SBSQ HOSP IP/OBS MODERATE 35: CPT | Mod: GC,,, | Performed by: FAMILY MEDICINE

## 2023-01-01 PROCEDURE — 82962 GLUCOSE BLOOD TEST: CPT

## 2023-01-01 PROCEDURE — 80053 COMPREHEN METABOLIC PANEL: CPT

## 2023-01-01 PROCEDURE — 94761 N-INVAS EAR/PLS OXIMETRY MLT: CPT

## 2023-01-01 PROCEDURE — 93010 EKG 12-LEAD: ICD-10-PCS | Mod: ,,, | Performed by: HOSPITALIST

## 2023-01-01 PROCEDURE — 82248 BILIRUBIN DIRECT: CPT

## 2023-01-01 PROCEDURE — 87186 SC STD MICRODIL/AGAR DIL: CPT | Performed by: EMERGENCY MEDICINE

## 2023-01-01 PROCEDURE — 96375 TX/PRO/DX INJ NEW DRUG ADDON: CPT

## 2023-01-01 PROCEDURE — 25000003 PHARM REV CODE 250

## 2023-01-01 PROCEDURE — 99233 PR SUBSEQUENT HOSPITAL CARE,LEVL III: ICD-10-PCS | Mod: GC,,, | Performed by: FAMILY MEDICINE

## 2023-01-01 PROCEDURE — 25000242 PHARM REV CODE 250 ALT 637 W/ HCPCS

## 2023-01-01 PROCEDURE — 99223 PR INITIAL HOSPITAL CARE,LEVL III: ICD-10-PCS | Mod: ,,, | Performed by: SURGERY

## 2023-01-01 PROCEDURE — 99239 HOSP IP/OBS DSCHRG MGMT >30: CPT | Mod: ,,, | Performed by: FAMILY MEDICINE

## 2023-01-01 PROCEDURE — 88112 CYTOPATH CELL ENHANCE TECH: CPT | Mod: 26,,, | Performed by: PATHOLOGY

## 2023-01-01 PROCEDURE — 87070 CULTURE OTHR SPECIMN AEROBIC: CPT

## 2023-01-01 PROCEDURE — 63600175 PHARM REV CODE 636 W HCPCS: Performed by: EMERGENCY MEDICINE

## 2023-01-01 PROCEDURE — 80053 COMPREHEN METABOLIC PANEL: CPT | Performed by: FAMILY MEDICINE

## 2023-01-01 PROCEDURE — 96374 THER/PROPH/DIAG INJ IV PUSH: CPT

## 2023-01-01 PROCEDURE — 88112 NON-GYN CYTOLOGY: ICD-10-PCS | Mod: 26,,, | Performed by: PATHOLOGY

## 2023-01-01 PROCEDURE — 99233 SBSQ HOSP IP/OBS HIGH 50: CPT | Mod: ,,, | Performed by: SURGERY

## 2023-01-01 PROCEDURE — 84443 ASSAY THYROID STIM HORMONE: CPT | Performed by: HOSPITALIST

## 2023-01-01 PROCEDURE — 82248 BILIRUBIN DIRECT: CPT | Performed by: FAMILY MEDICINE

## 2023-01-01 PROCEDURE — 83735 ASSAY OF MAGNESIUM: CPT | Performed by: HOSPITALIST

## 2023-01-01 PROCEDURE — 99232 PR SUBSEQUENT HOSPITAL CARE,LEVL II: ICD-10-PCS | Mod: GC,,, | Performed by: FAMILY MEDICINE

## 2023-01-01 PROCEDURE — 99900035 HC TECH TIME PER 15 MIN (STAT)

## 2023-01-01 PROCEDURE — 85610 PROTHROMBIN TIME: CPT

## 2023-01-01 PROCEDURE — 94640 AIRWAY INHALATION TREATMENT: CPT

## 2023-01-01 PROCEDURE — 93010 EKG 12-LEAD: ICD-10-PCS | Mod: ,,, | Performed by: INTERNAL MEDICINE

## 2023-01-01 PROCEDURE — 87040 BLOOD CULTURE FOR BACTERIA: CPT | Performed by: EMERGENCY MEDICINE

## 2023-01-01 PROCEDURE — 85025 COMPLETE CBC W/AUTO DIFF WBC: CPT | Performed by: EMERGENCY MEDICINE

## 2023-01-01 PROCEDURE — 81001 URINALYSIS AUTO W/SCOPE: CPT | Performed by: EMERGENCY MEDICINE

## 2023-01-01 PROCEDURE — 82140 ASSAY OF AMMONIA: CPT | Performed by: EMERGENCY MEDICINE

## 2023-01-01 PROCEDURE — 87804 INFLUENZA ASSAY W/OPTIC: CPT | Mod: 59 | Performed by: EMERGENCY MEDICINE

## 2023-01-01 PROCEDURE — 99284 EMERGENCY DEPT VISIT MOD MDM: CPT | Mod: EDII,,, | Performed by: EMERGENCY MEDICINE

## 2023-01-01 PROCEDURE — S0030 INJECTION, METRONIDAZOLE: HCPCS | Performed by: HOSPITALIST

## 2023-01-01 PROCEDURE — 96365 THER/PROPH/DIAG IV INF INIT: CPT

## 2023-01-01 PROCEDURE — 63600175 PHARM REV CODE 636 W HCPCS: Performed by: FAMILY MEDICINE

## 2023-01-01 PROCEDURE — 88305 TISSUE EXAM BY PATHOLOGIST: CPT | Mod: TC,MCY

## 2023-01-01 PROCEDURE — 80053 COMPREHEN METABOLIC PANEL: CPT | Performed by: HOSPITALIST

## 2023-01-01 PROCEDURE — 93005 ELECTROCARDIOGRAM TRACING: CPT

## 2023-01-01 PROCEDURE — 63600175 PHARM REV CODE 636 W HCPCS

## 2023-01-01 PROCEDURE — 51798 US URINE CAPACITY MEASURE: CPT

## 2023-01-01 PROCEDURE — 25000003 PHARM REV CODE 250: Performed by: EMERGENCY MEDICINE

## 2023-01-01 PROCEDURE — 99284 EMERGENCY DEPT VISIT MOD MDM: CPT | Performed by: INTERNAL MEDICINE

## 2023-01-01 PROCEDURE — 99233 SBSQ HOSP IP/OBS HIGH 50: CPT | Mod: GC,,, | Performed by: FAMILY MEDICINE

## 2023-01-01 PROCEDURE — 63600175 PHARM REV CODE 636 W HCPCS: Performed by: HOSPITALIST

## 2023-01-01 PROCEDURE — 88305 NON-GYN CYTOLOGY: ICD-10-PCS | Mod: 26,,, | Performed by: PATHOLOGY

## 2023-01-01 PROCEDURE — 80053 COMPREHEN METABOLIC PANEL: CPT | Performed by: EMERGENCY MEDICINE

## 2023-01-01 PROCEDURE — 88305 TISSUE EXAM BY PATHOLOGIST: CPT | Mod: 26,,, | Performed by: PATHOLOGY

## 2023-01-01 PROCEDURE — 85025 COMPLETE CBC W/AUTO DIFF WBC: CPT

## 2023-01-01 PROCEDURE — 89051 BODY FLUID CELL COUNT: CPT

## 2023-01-01 PROCEDURE — 99284 PR EMERGENCY DEPT VISIT,LEVEL IV: ICD-10-PCS | Mod: EDII,,, | Performed by: EMERGENCY MEDICINE

## 2023-01-01 PROCEDURE — 93010 ELECTROCARDIOGRAM REPORT: CPT | Mod: ,,, | Performed by: INTERNAL MEDICINE

## 2023-01-01 PROCEDURE — 99285 EMERGENCY DEPT VISIT HI MDM: CPT | Mod: 25

## 2023-01-01 PROCEDURE — 99285 EMERGENCY DEPT VISIT HI MDM: CPT | Performed by: EMERGENCY MEDICINE

## 2023-01-01 PROCEDURE — 85610 PROTHROMBIN TIME: CPT | Performed by: EMERGENCY MEDICINE

## 2023-01-01 PROCEDURE — 93010 ELECTROCARDIOGRAM REPORT: CPT | Mod: ,,, | Performed by: STUDENT IN AN ORGANIZED HEALTH CARE EDUCATION/TRAINING PROGRAM

## 2023-01-01 PROCEDURE — 25000003 PHARM REV CODE 250: Performed by: FAMILY MEDICINE

## 2023-01-01 PROCEDURE — 99239 PR HOSPITAL DISCHARGE DAY,>30 MIN: ICD-10-PCS | Mod: ,,, | Performed by: FAMILY MEDICINE

## 2023-01-01 PROCEDURE — 99223 1ST HOSP IP/OBS HIGH 75: CPT | Mod: ,,, | Performed by: SURGERY

## 2023-01-01 PROCEDURE — 82247 BILIRUBIN TOTAL: CPT

## 2023-01-01 PROCEDURE — 99233 PR SUBSEQUENT HOSPITAL CARE,LEVL III: ICD-10-PCS | Mod: ,,, | Performed by: SURGERY

## 2023-01-01 PROCEDURE — 99499 UNLISTED E&M SERVICE: CPT | Mod: GC,,, | Performed by: FAMILY MEDICINE

## 2023-01-01 PROCEDURE — 85025 COMPLETE CBC W/AUTO DIFF WBC: CPT | Performed by: HOSPITALIST

## 2023-01-01 PROCEDURE — 99223 PR INITIAL HOSPITAL CARE,LEVL III: ICD-10-PCS | Mod: AI,,, | Performed by: HOSPITALIST

## 2023-01-01 PROCEDURE — 83880 ASSAY OF NATRIURETIC PEPTIDE: CPT | Performed by: HOSPITALIST

## 2023-01-01 PROCEDURE — 85730 THROMBOPLASTIN TIME PARTIAL: CPT | Performed by: EMERGENCY MEDICINE

## 2023-01-01 PROCEDURE — 83690 ASSAY OF LIPASE: CPT | Performed by: EMERGENCY MEDICINE

## 2023-01-01 PROCEDURE — 82150 ASSAY OF AMYLASE: CPT | Performed by: EMERGENCY MEDICINE

## 2023-01-01 PROCEDURE — 84484 ASSAY OF TROPONIN QUANT: CPT | Performed by: HOSPITALIST

## 2023-01-01 PROCEDURE — 83615 LACTATE (LD) (LDH) ENZYME: CPT

## 2023-01-01 PROCEDURE — 84157 ASSAY OF PROTEIN OTHER: CPT

## 2023-01-01 PROCEDURE — 87077 CULTURE AEROBIC IDENTIFY: CPT | Performed by: EMERGENCY MEDICINE

## 2023-01-01 PROCEDURE — 83605 ASSAY OF LACTIC ACID: CPT | Performed by: EMERGENCY MEDICINE

## 2023-01-01 PROCEDURE — 25000003 PHARM REV CODE 250: Performed by: INTERNAL MEDICINE

## 2023-01-01 PROCEDURE — 89050 BODY FLUID CELL COUNT: CPT

## 2023-01-01 PROCEDURE — 84484 ASSAY OF TROPONIN QUANT: CPT

## 2023-01-01 PROCEDURE — 85730 THROMBOPLASTIN TIME PARTIAL: CPT

## 2023-01-01 PROCEDURE — 93010 ELECTROCARDIOGRAM REPORT: CPT | Mod: ,,, | Performed by: HOSPITALIST

## 2023-01-01 PROCEDURE — 84155 ASSAY OF PROTEIN SERUM: CPT

## 2023-01-01 PROCEDURE — 99223 1ST HOSP IP/OBS HIGH 75: CPT | Mod: AI,,, | Performed by: HOSPITALIST

## 2023-01-01 PROCEDURE — 99499 NO LOS: ICD-10-PCS | Mod: GC,,, | Performed by: FAMILY MEDICINE

## 2023-01-01 PROCEDURE — 83880 ASSAY OF NATRIURETIC PEPTIDE: CPT | Performed by: EMERGENCY MEDICINE

## 2023-01-01 PROCEDURE — 93010 EKG 12-LEAD: ICD-10-PCS | Mod: ,,, | Performed by: STUDENT IN AN ORGANIZED HEALTH CARE EDUCATION/TRAINING PROGRAM

## 2023-01-01 PROCEDURE — 87075 CULTR BACTERIA EXCEPT BLOOD: CPT

## 2023-01-01 PROCEDURE — 83605 ASSAY OF LACTIC ACID: CPT

## 2023-01-01 RX ORDER — HYDRALAZINE HYDROCHLORIDE 25 MG/1
25 TABLET, FILM COATED ORAL 2 TIMES DAILY
Status: DISCONTINUED | OUTPATIENT
Start: 2023-01-01 | End: 2023-01-01

## 2023-01-01 RX ORDER — TAMSULOSIN HYDROCHLORIDE 0.4 MG/1
0.4 CAPSULE ORAL DAILY
Status: DISCONTINUED | OUTPATIENT
Start: 2023-01-01 | End: 2023-01-01

## 2023-01-01 RX ORDER — DIPHENHYDRAMINE HYDROCHLORIDE 50 MG/ML
50 INJECTION INTRAMUSCULAR; INTRAVENOUS EVERY 6 HOURS PRN
Status: DISCONTINUED | OUTPATIENT
Start: 2023-01-01 | End: 2023-01-01 | Stop reason: HOSPADM

## 2023-01-01 RX ORDER — TRAZODONE HYDROCHLORIDE 50 MG/1
50 TABLET ORAL NIGHTLY PRN
Status: DISCONTINUED | OUTPATIENT
Start: 2023-01-01 | End: 2023-01-01

## 2023-01-01 RX ORDER — DILTIAZEM HYDROCHLORIDE 120 MG/1
240 CAPSULE, COATED, EXTENDED RELEASE ORAL DAILY
Status: DISCONTINUED | OUTPATIENT
Start: 2023-01-01 | End: 2023-01-01

## 2023-01-01 RX ORDER — MORPHINE SULFATE 2 MG/ML
2 INJECTION, SOLUTION INTRAMUSCULAR; INTRAVENOUS ONCE
Status: DISCONTINUED | OUTPATIENT
Start: 2023-01-01 | End: 2023-01-01

## 2023-01-01 RX ORDER — HYDROMORPHONE HYDROCHLORIDE 2 MG/ML
0.5 INJECTION, SOLUTION INTRAMUSCULAR; INTRAVENOUS; SUBCUTANEOUS ONCE
Status: COMPLETED | OUTPATIENT
Start: 2023-01-01 | End: 2023-01-01

## 2023-01-01 RX ORDER — SODIUM CHLORIDE 9 MG/ML
INJECTION, SOLUTION INTRAVENOUS CONTINUOUS
Status: DISCONTINUED | OUTPATIENT
Start: 2023-01-01 | End: 2023-01-01

## 2023-01-01 RX ORDER — MORPHINE SULFATE 2 MG/ML
2 INJECTION, SOLUTION INTRAMUSCULAR; INTRAVENOUS EVERY 6 HOURS PRN
Status: DISCONTINUED | OUTPATIENT
Start: 2023-01-01 | End: 2023-01-01

## 2023-01-01 RX ORDER — MORPHINE SULFATE 2 MG/ML
2 INJECTION, SOLUTION INTRAMUSCULAR; INTRAVENOUS
Status: DISCONTINUED | OUTPATIENT
Start: 2023-01-01 | End: 2023-01-01 | Stop reason: HOSPADM

## 2023-01-01 RX ORDER — ONDANSETRON 2 MG/ML
8 INJECTION INTRAMUSCULAR; INTRAVENOUS EVERY 6 HOURS PRN
Status: DISCONTINUED | OUTPATIENT
Start: 2023-01-01 | End: 2023-01-01 | Stop reason: HOSPADM

## 2023-01-01 RX ORDER — HYDROMORPHONE HYDROCHLORIDE 1 MG/ML
0.5 INJECTION, SOLUTION INTRAMUSCULAR; INTRAVENOUS; SUBCUTANEOUS
Status: COMPLETED | OUTPATIENT
Start: 2023-01-01 | End: 2023-01-01

## 2023-01-01 RX ORDER — METRONIDAZOLE 500 MG/100ML
500 INJECTION, SOLUTION INTRAVENOUS
Status: DISCONTINUED | OUTPATIENT
Start: 2023-01-01 | End: 2023-01-01

## 2023-01-01 RX ORDER — DILTIAZEM HYDROCHLORIDE 240 MG/1
240 CAPSULE, COATED, EXTENDED RELEASE ORAL DAILY
Qty: 30 CAPSULE | Refills: 0 | Status: ON HOLD | OUTPATIENT
Start: 2023-01-01 | End: 2023-01-01

## 2023-01-01 RX ORDER — METOPROLOL TARTRATE 50 MG/1
50 TABLET ORAL
Status: COMPLETED | OUTPATIENT
Start: 2023-01-01 | End: 2023-01-01

## 2023-01-01 RX ORDER — IPRATROPIUM BROMIDE AND ALBUTEROL SULFATE 2.5; .5 MG/3ML; MG/3ML
3 SOLUTION RESPIRATORY (INHALATION) EVERY 6 HOURS
Status: DISCONTINUED | OUTPATIENT
Start: 2023-01-01 | End: 2023-01-01

## 2023-01-01 RX ORDER — DILTIAZEM HYDROCHLORIDE 120 MG/1
120 CAPSULE, COATED, EXTENDED RELEASE ORAL
Status: COMPLETED | OUTPATIENT
Start: 2023-01-01 | End: 2023-01-01

## 2023-01-01 RX ORDER — DILTIAZEM HYDROCHLORIDE 5 MG/ML
20 INJECTION INTRAVENOUS
Status: COMPLETED | OUTPATIENT
Start: 2023-01-01 | End: 2023-01-01

## 2023-01-01 RX ORDER — BISACODYL 5 MG
10 TABLET, DELAYED RELEASE (ENTERIC COATED) ORAL DAILY PRN
Status: DISCONTINUED | OUTPATIENT
Start: 2023-01-01 | End: 2023-01-01

## 2023-01-01 RX ORDER — ISOSORBIDE DINITRATE 20 MG/1
20 TABLET ORAL 3 TIMES DAILY
Status: DISCONTINUED | OUTPATIENT
Start: 2023-01-01 | End: 2023-01-01

## 2023-01-01 RX ORDER — MORPHINE SULFATE 2 MG/ML
2 INJECTION, SOLUTION INTRAMUSCULAR; INTRAVENOUS
Status: DISCONTINUED | OUTPATIENT
Start: 2023-01-01 | End: 2023-01-01

## 2023-01-01 RX ORDER — GUAIFENESIN/DEXTROMETHORPHAN 100-10MG/5
10 SYRUP ORAL EVERY 6 HOURS PRN
Status: DISCONTINUED | OUTPATIENT
Start: 2023-01-01 | End: 2023-01-01

## 2023-01-01 RX ORDER — SODIUM CHLORIDE 9 MG/ML
INJECTION, SOLUTION INTRAVENOUS ONCE
Status: COMPLETED | OUTPATIENT
Start: 2023-01-01 | End: 2023-01-01

## 2023-01-01 RX ORDER — SIMETHICONE 80 MG
1 TABLET,CHEWABLE ORAL 3 TIMES DAILY PRN
Status: DISCONTINUED | OUTPATIENT
Start: 2023-01-01 | End: 2023-01-01

## 2023-01-01 RX ORDER — DILTIAZEM HYDROCHLORIDE 5 MG/ML
25 INJECTION INTRAVENOUS
Status: COMPLETED | OUTPATIENT
Start: 2023-01-01 | End: 2023-01-01

## 2023-01-01 RX ORDER — FENTANYL 12.5 UG/1
1 PATCH TRANSDERMAL
Status: DISCONTINUED | OUTPATIENT
Start: 2023-01-01 | End: 2023-01-01 | Stop reason: HOSPADM

## 2023-01-01 RX ORDER — SODIUM CHLORIDE 9 MG/ML
1000 INJECTION, SOLUTION INTRAVENOUS
Status: COMPLETED | OUTPATIENT
Start: 2023-01-01 | End: 2023-01-01

## 2023-01-01 RX ORDER — MORPHINE SULFATE 2 MG/ML
2 INJECTION, SOLUTION INTRAMUSCULAR; INTRAVENOUS ONCE
Status: COMPLETED | OUTPATIENT
Start: 2023-01-01 | End: 2023-01-01

## 2023-01-01 RX ORDER — ALBUTEROL SULFATE 0.83 MG/ML
2.5 SOLUTION RESPIRATORY (INHALATION) EVERY 4 HOURS PRN
Status: DISCONTINUED | OUTPATIENT
Start: 2023-01-01 | End: 2023-01-01 | Stop reason: HOSPADM

## 2023-01-01 RX ADMIN — IPRATROPIUM BROMIDE AND ALBUTEROL SULFATE 3 ML: 2.5; .5 SOLUTION RESPIRATORY (INHALATION) at 12:01

## 2023-01-01 RX ADMIN — IPRATROPIUM BROMIDE AND ALBUTEROL SULFATE 3 ML: 2.5; .5 SOLUTION RESPIRATORY (INHALATION) at 07:01

## 2023-01-01 RX ADMIN — BISACODYL 10 MG: 5 TABLET, COATED ORAL at 09:01

## 2023-01-01 RX ADMIN — PIPERACILLIN AND TAZOBACTAM 4.5 G: 4; .5 INJECTION, POWDER, FOR SOLUTION INTRAVENOUS; PARENTERAL at 02:01

## 2023-01-01 RX ADMIN — TAMSULOSIN HYDROCHLORIDE 0.4 MG: 0.4 CAPSULE ORAL at 08:01

## 2023-01-01 RX ADMIN — MORPHINE SULFATE 2 MG: 2 INJECTION, SOLUTION INTRAMUSCULAR; INTRAVENOUS at 02:01

## 2023-01-01 RX ADMIN — AZITHROMYCIN MONOHYDRATE 500 MG: 500 INJECTION, POWDER, LYOPHILIZED, FOR SOLUTION INTRAVENOUS at 12:01

## 2023-01-01 RX ADMIN — DILTIAZEM HYDROCHLORIDE 240 MG: 120 CAPSULE, COATED, EXTENDED RELEASE ORAL at 08:01

## 2023-01-01 RX ADMIN — PIPERACILLIN AND TAZOBACTAM 4.5 G: 4; .5 INJECTION, POWDER, FOR SOLUTION INTRAVENOUS; PARENTERAL at 09:01

## 2023-01-01 RX ADMIN — METRONIDAZOLE 500 MG: 5 INJECTION, SOLUTION INTRAVENOUS at 09:01

## 2023-01-01 RX ADMIN — SODIUM CHLORIDE: 9 INJECTION, SOLUTION INTRAVENOUS at 08:01

## 2023-01-01 RX ADMIN — ALBUTEROL SULFATE 2.5 MG: 2.5 SOLUTION RESPIRATORY (INHALATION) at 01:01

## 2023-01-01 RX ADMIN — HYDROMORPHONE HYDROCHLORIDE 0.5 MG: 1 INJECTION, SOLUTION INTRAMUSCULAR; INTRAVENOUS; SUBCUTANEOUS at 05:01

## 2023-01-01 RX ADMIN — PIPERACILLIN AND TAZOBACTAM 4.5 G: 4; .5 INJECTION, POWDER, FOR SOLUTION INTRAVENOUS; PARENTERAL at 12:01

## 2023-01-01 RX ADMIN — HYDRALAZINE HYDROCHLORIDE 25 MG: 25 TABLET ORAL at 01:01

## 2023-01-01 RX ADMIN — SODIUM CHLORIDE 1000 ML: 9 INJECTION, SOLUTION INTRAVENOUS at 03:01

## 2023-01-01 RX ADMIN — SIMETHICONE CHEW TAB 80 MG 80 MG: 80 TABLET ORAL at 04:01

## 2023-01-01 RX ADMIN — PIPERACILLIN AND TAZOBACTAM 4.5 G: 4; .5 INJECTION, POWDER, FOR SOLUTION INTRAVENOUS; PARENTERAL at 08:01

## 2023-01-01 RX ADMIN — DILTIAZEM HYDROCHLORIDE 20 MG: 5 INJECTION INTRAVENOUS at 05:01

## 2023-01-01 RX ADMIN — ALUMINUM HYDROXIDE AND MAGNESIUM HYDROXIDE 30 ML: 200; 200 SUSPENSION ORAL at 06:01

## 2023-01-01 RX ADMIN — PIPERACILLIN AND TAZOBACTAM 4.5 G: 4; .5 INJECTION, POWDER, FOR SOLUTION INTRAVENOUS; PARENTERAL at 03:01

## 2023-01-01 RX ADMIN — FENTANYL TRANSDERMAL 1 PATCH: 12.5 PATCH, EXTENDED RELEASE TRANSDERMAL at 12:01

## 2023-01-01 RX ADMIN — PIPERACILLIN AND TAZOBACTAM 4.5 G: 4; .5 INJECTION, POWDER, FOR SOLUTION INTRAVENOUS; PARENTERAL at 04:01

## 2023-01-01 RX ADMIN — SIMETHICONE CHEW TAB 80 MG 80 MG: 80 TABLET ORAL at 03:01

## 2023-01-01 RX ADMIN — SODIUM CHLORIDE 250 ML: 9 INJECTION, SOLUTION INTRAVENOUS at 06:01

## 2023-01-01 RX ADMIN — IPRATROPIUM BROMIDE AND ALBUTEROL SULFATE 3 ML: 2.5; .5 SOLUTION RESPIRATORY (INHALATION) at 01:01

## 2023-01-01 RX ADMIN — PIPERACILLIN AND TAZOBACTAM 4.5 G: 4; .5 INJECTION, POWDER, FOR SOLUTION INTRAVENOUS; PARENTERAL at 06:01

## 2023-01-01 RX ADMIN — SODIUM CHLORIDE 1000 ML: 9 INJECTION, SOLUTION INTRAVENOUS at 05:01

## 2023-01-01 RX ADMIN — AZITHROMYCIN MONOHYDRATE 500 MG: 500 INJECTION, POWDER, LYOPHILIZED, FOR SOLUTION INTRAVENOUS at 03:01

## 2023-01-01 RX ADMIN — DIPHENHYDRAMINE HYDROCHLORIDE 50 MG: 50 INJECTION, SOLUTION INTRAMUSCULAR; INTRAVENOUS at 04:01

## 2023-01-01 RX ADMIN — MORPHINE SULFATE 2 MG: 2 INJECTION, SOLUTION INTRAMUSCULAR; INTRAVENOUS at 01:01

## 2023-01-01 RX ADMIN — GUAIFENESIN AND DEXTROMETHORPHAN 10 ML: 100; 10 SYRUP ORAL at 09:01

## 2023-01-01 RX ADMIN — DILTIAZEM HYDROCHLORIDE 240 MG: 120 CAPSULE, COATED, EXTENDED RELEASE ORAL at 09:01

## 2023-01-01 RX ADMIN — SODIUM CHLORIDE: 9 INJECTION, SOLUTION INTRAVENOUS at 01:01

## 2023-01-01 RX ADMIN — DILTIAZEM HYDROCHLORIDE 25 MG: 5 INJECTION INTRAVENOUS at 05:01

## 2023-01-01 RX ADMIN — METOPROLOL TARTRATE 50 MG: 50 TABLET, FILM COATED ORAL at 07:01

## 2023-01-01 RX ADMIN — ALBUTEROL SULFATE 2.5 MG: 2.5 SOLUTION RESPIRATORY (INHALATION) at 09:01

## 2023-01-01 RX ADMIN — HYDROMORPHONE HYDROCHLORIDE 0.5 MG: 2 INJECTION, SOLUTION INTRAMUSCULAR; INTRAVENOUS; SUBCUTANEOUS at 05:01

## 2023-01-01 RX ADMIN — MORPHINE SULFATE 2 MG: 2 INJECTION, SOLUTION INTRAMUSCULAR; INTRAVENOUS at 03:01

## 2023-01-01 RX ADMIN — PIPERACILLIN AND TAZOBACTAM 4.5 G: 4; .5 INJECTION, POWDER, FOR SOLUTION INTRAVENOUS; PARENTERAL at 05:01

## 2023-01-01 RX ADMIN — MORPHINE SULFATE 2 MG: 2 INJECTION, SOLUTION INTRAMUSCULAR; INTRAVENOUS at 07:01

## 2023-01-01 RX ADMIN — SODIUM CHLORIDE, POTASSIUM CHLORIDE, SODIUM LACTATE AND CALCIUM CHLORIDE 1000 ML: 600; 310; 30; 20 INJECTION, SOLUTION INTRAVENOUS at 03:01

## 2023-01-01 RX ADMIN — AZITHROMYCIN MONOHYDRATE 500 MG: 500 INJECTION, POWDER, LYOPHILIZED, FOR SOLUTION INTRAVENOUS at 02:01

## 2023-01-01 RX ADMIN — MORPHINE SULFATE 2 MG: 2 INJECTION, SOLUTION INTRAMUSCULAR; INTRAVENOUS at 09:01

## 2023-01-01 RX ADMIN — TAMSULOSIN HYDROCHLORIDE 0.4 MG: 0.4 CAPSULE ORAL at 09:01

## 2023-01-01 RX ADMIN — PIPERACILLIN SODIUM AND TAZOBACTAM SODIUM 4.5 G: 4; .5 INJECTION, POWDER, LYOPHILIZED, FOR SOLUTION INTRAVENOUS at 03:01

## 2023-01-01 RX ADMIN — AZITHROMYCIN MONOHYDRATE 500 MG: 500 INJECTION, POWDER, LYOPHILIZED, FOR SOLUTION INTRAVENOUS at 01:01

## 2023-01-01 RX ADMIN — IPRATROPIUM BROMIDE AND ALBUTEROL SULFATE 3 ML: 2.5; .5 SOLUTION RESPIRATORY (INHALATION) at 08:01

## 2023-01-01 RX ADMIN — METRONIDAZOLE 500 MG: 5 INJECTION, SOLUTION INTRAVENOUS at 01:01

## 2023-01-01 RX ADMIN — GUAIFENESIN AND DEXTROMETHORPHAN 10 ML: 100; 10 SYRUP ORAL at 10:01

## 2023-01-01 RX ADMIN — CEFTRIAXONE SODIUM 1 G: 1 INJECTION, POWDER, FOR SOLUTION INTRAMUSCULAR; INTRAVENOUS at 01:01

## 2023-01-01 RX ADMIN — DILTIAZEM HYDROCHLORIDE 120 MG: 120 CAPSULE, COATED, EXTENDED RELEASE ORAL at 06:01

## 2023-01-01 RX ADMIN — MORPHINE SULFATE 2 MG: 2 INJECTION, SOLUTION INTRAMUSCULAR; INTRAVENOUS at 08:01

## 2023-01-01 RX ADMIN — ALBUTEROL SULFATE 2.5 MG: 2.5 SOLUTION RESPIRATORY (INHALATION) at 07:01

## 2023-01-10 NOTE — ED NOTES
Called [facility] to speak with Leslie Almazan (she orginally called report and stated Dr Fournier decreased pt Cardizem to Dly he was on Cardizem QID.) Leslie Almazan was not available had already gone home. Spoke with Amelia Kruse stated pt was taking Cardizem 30mg QID and DR Fournier changed it to 120mg Dly

## 2023-01-10 NOTE — ED TRIAGE NOTES
RECEIVED REPORT FROM CHANO MALONE, RN @ [facility]; RN STATED PATIENT WAS SOB WITH HEART RATE BETWEEN  & IRREGULAR; RN STATED DR. DEAN STARTED PATIENT ON CARDIZEM 4 TIMES A DAY X 4 DAYS & IS NOW DLY & DR. DEAN WANTED PATIENT TO BE EVALUATED    PER EMS PATIENT WAS IN A-FIB WITH RVR & HEART RATE     PATIENT DOES NOT APPEAR TO BE IN ANY DISTRESS; ABLE TO HOLD CONVERSATION WITH COMPLETE SENTENCES WITHOUT DIFFICULTY; HAS NOT EXHIBITED ANY S/S OF SOB

## 2023-01-11 NOTE — ED PROVIDER NOTES
Encounter Date: 1/10/2023       History     Chief Complaint   Patient presents with    Tachycardia     Patient presents to the emergency department via EMS from the nursing home with report of a recurrent episode of supraventricular tachycardia.  Patient was seen approximately 3 weeks ago for same in the emergency department was started on diltiazem.  Nursing home states patient has been on diltiazem 30 mg 4 times daily approximately 1 week and then was switched to extended-release diltiazem 120 mg once daily.  Patient denies shortness of breath, denies chest pain.  Patient is on Eliquis as well for paroxysmal atrial fibrillation related embolic stroke prevention.  Patient has a history of CVA in the past with resultant left hemiparesis.  No new neurologic deficits.    Review of patient's allergies indicates:  No Known Allergies  Past Medical History:   Diagnosis Date    Age-related nuclear cataract     Anemia, unspecified     Benign prostatic hyperplasia without lower urinary tract symptoms     Cerebral infarct     Constipation     Coronary artery disease     Depression     Essential (primary) hypertension     Liver disease, unspecified     Mixed hyperlipidemia     Paroxysmal atrial fibrillation     Presbyopia      Past Surgical History:   Procedure Laterality Date    right hip       History reviewed. No pertinent family history.  Social History     Tobacco Use    Smoking status: Never    Smokeless tobacco: Never   Substance Use Topics    Alcohol use: Not Currently    Drug use: Never     Review of Systems   Constitutional: Negative.    HENT: Negative.     Eyes: Negative.    Respiratory: Negative.  Negative for shortness of breath.    Cardiovascular:  Positive for palpitations. Negative for chest pain and leg swelling.   Gastrointestinal: Negative.    Genitourinary: Negative.    Musculoskeletal: Negative.    Skin: Negative.    Hematological: Negative.    Psychiatric/Behavioral: Negative.     All other systems  reviewed and are negative.    Physical Exam     Initial Vitals [01/10/23 1729]   BP Pulse Resp Temp SpO2   (!) 158/95 (!) 149 20 98.9 °F (37.2 °C) 97 %      MAP       --         Physical Exam    Nursing note and vitals reviewed.  Constitutional: He appears well-developed and well-nourished.   HENT:   Head: Normocephalic.   Right Ear: External ear normal.   Left Ear: External ear normal.   Nose: Nose normal.   Mouth/Throat: Oropharynx is clear and moist. No oropharyngeal exudate.   Eyes: Conjunctivae and EOM are normal. Pupils are equal, round, and reactive to light.   Neck: Neck supple. No JVD present.   Normal range of motion.  Cardiovascular:  Normal heart sounds and intact distal pulses. An irregularly irregular rhythm present.   Tachycardia present.         No murmur heard.  Pulmonary/Chest: Breath sounds normal. No stridor. No respiratory distress. He has no wheezes. He has no rhonchi. He has no rales.   Abdominal: Abdomen is soft. Bowel sounds are normal. He exhibits no distension. There is no abdominal tenderness.   Musculoskeletal:         General: No edema.      Cervical back: Normal range of motion and neck supple.     Lymphadenopathy:     He has no cervical adenopathy.   Neurological: He is alert. No cranial nerve deficit. GCS score is 15. GCS eye subscore is 4. GCS verbal subscore is 5. GCS motor subscore is 6.   Patient has a left hemiparesis and is at baseline.   Skin: Skin is warm and dry. Capillary refill takes less than 2 seconds. No rash noted. No erythema. There is pallor.   Psychiatric: He has a normal mood and affect. His behavior is normal.       Medical Screening Exam   See Full Note    ED Course   Procedures  Labs Reviewed   POCT GLUCOSE MONITORING CONTINUOUS        ECG Results              EKG 12-lead (In process)  Result time 01/10/23 17:38:32      In process by Interface, Lab In [facility] (01/10/23 17:38:32)                   Narrative:    Test Reason : R00.0,    Vent. Rate : 149 BPM      Atrial Rate : 150 BPM     P-R Int : 000 ms          QRS Dur : 086 ms      QT Int : 266 ms       P-R-T Axes : 000 082 267 degrees     QTc Int : 418 ms    Supraventricular tachycardia  Anterior infarct ,age undetermined  ST and T wave abnormality, consider inferior ischemia  Abnormal ECG  When compared with ECG of 21-DEC-2022 15:14,  Sinus rhythm has replaced Atrial fibrillation  Vent. rate has increased BY  60 BPM  ST now depressed in Inferior leads  ST now depressed in Lateral leads  T wave inversion now evident in Inferior leads    Referred By:  JUSTIN           Confirmed By:                                   Imaging Results    None          Medications   diltiaZEM injection 20 mg (20 mg Intravenous Given 1/10/23 1741)   diltiaZEM injection 25 mg (25 mg Intravenous Given 1/10/23 1758)   diltiaZEM 24 hr capsule 120 mg (120 mg Oral Given 1/10/23 1819)   metoprolol tartrate (LOPRESSOR) tablet 50 mg (50 mg Oral Given 1/10/23 1933)     Medical Decision Making:   ED Management:  Patient was treated with IV diltiazem 20 mg and heart rate came down to the low 100s then went up again.  Patient was then given diltiazem 25 mg IV, heart rate came down to the 90s and remained there with a stable blood pressure.  Patient was then given oral 120 mg p.o. diltiazem.  Will discharge back to the nursing home on increased dosage of diltiazem at 240 mg total daily.           ED Course as of 01/11/23 0950   Tue Aj 10, 2023   1933 Patient heart rate went back up but then came back down.  He is on Cardizem.  Will give Lopressor now in addition to cause him his already had x2.  Referring back to nursing home. [PW]      ED Course User Index  [PW] Aaron Malik MD          Clinical Impression:   Final diagnoses:  [R00.0] Tachycardia  [I48.91] Atrial fibrillation with RVR - Now controlled (Primary)        ED Disposition Condition    Discharge Stable          ED Prescriptions       Medication Sig Dispense Start Date End Date Auth. Provider     diltiaZEM (CARDIZEM CD) 240 MG 24 hr capsule Take 1 capsule (240 mg total) by mouth once daily. 30 capsule 1/10/2023 2/9/2023 [place] Choudhary DO          Follow-up Information       Follow up With Specialties Details Why Contact Info    Elliott Fournier DO Family Medicine Schedule an appointment as soon as possible for a visit in 2 days To recheck heart rate and blood pressure, and adjust medications as indicated. [address] [address] [id number] [phone]               North Choudhary DO  01/10/23 1830       North Choudhary DO  01/11/23 2341

## 2023-01-11 NOTE — ED PROVIDER NOTES
Encounter Date: 1/10/2023       History     Chief Complaint   Patient presents with    Tachycardia     HPI  Review of patient's allergies indicates:  No Known Allergies  Past Medical History:   Diagnosis Date    Age-related nuclear cataract     Anemia, unspecified     Benign prostatic hyperplasia without lower urinary tract symptoms     Cerebral infarct     Constipation     Coronary artery disease     Depression     Essential (primary) hypertension     Liver disease, unspecified     Mixed hyperlipidemia     Paroxysmal atrial fibrillation     Presbyopia      Past Surgical History:   Procedure Laterality Date    right hip       History reviewed. No pertinent family history.  Social History     Tobacco Use    Smoking status: Never    Smokeless tobacco: Never   Substance Use Topics    Alcohol use: Not Currently    Drug use: Never     Review of Systems    Physical Exam     Initial Vitals [01/10/23 1729]   BP Pulse Resp Temp SpO2   (!) 158/95 (!) 149 20 98.9 °F (37.2 °C) 97 %      MAP       --         Physical Exam    Medical Screening Exam   See Full Note    ED Course   Procedures  Labs Reviewed   POCT GLUCOSE MONITORING CONTINUOUS        ECG Results              EKG 12-lead (In process)  Result time 01/10/23 17:38:32      In process by Interface, Lab In [facility] (01/10/23 17:38:32)                   Narrative:    Test Reason : R00.0,    Vent. Rate : 149 BPM     Atrial Rate : 150 BPM     P-R Int : 000 ms          QRS Dur : 086 ms      QT Int : 266 ms       P-R-T Axes : 000 082 267 degrees     QTc Int : 418 ms    Supraventricular tachycardia  Anterior infarct ,age undetermined  ST and T wave abnormality, consider inferior ischemia  Abnormal ECG  When compared with ECG of 21-DEC-2022 15:14,  Sinus rhythm has replaced Atrial fibrillation  Vent. rate has increased BY  60 BPM  ST now depressed in Inferior leads  ST now depressed in Lateral leads  T wave inversion now evident in Inferior leads    Referred By:  JUSTIN            Confirmed By:                                   Imaging Results    None          Medications   metoprolol tartrate (LOPRESSOR) tablet 50 mg (has no administration in time range)   diltiaZEM injection 20 mg (20 mg Intravenous Given 1/10/23 1741)   diltiaZEM injection 25 mg (25 mg Intravenous Given 1/10/23 1758)   diltiaZEM 24 hr capsule 120 mg (120 mg Oral Given 1/10/23 1819)                 ED Course as of 01/10/23 1933   Tue Aj 10, 2023   1933 Patient heart rate went back up but then came back down.  He is on Cardizem.  Will give Lopressor now in addition to cause him his already had x2.  Referring back to nursing home. [PW]      ED Course User Index  [PW] Aaron Malik MD          Clinical Impression:   Final diagnoses:  [R00.0] Tachycardia  [I48.91] Atrial fibrillation with RVR - Now controlled (Primary)        ED Disposition Condition    Discharge Stable          ED Prescriptions       Medication Sig Dispense Start Date End Date Auth. Provider    diltiaZEM (CARDIZEM CD) 240 MG 24 hr capsule Take 1 capsule (240 mg total) by mouth once daily. 30 capsule 1/10/2023 2/9/2023 [place] Choudhary, DO          Follow-up Information       Follow up With Specialties Details Why Contact Info    Elliott Fournier,  Family Medicine Schedule an appointment as soon as possible for a visit in 2 days To recheck heart rate and blood pressure, and adjust medications as indicated. [address] [address] [address] [phone]               Aaron Malik MD  01/10/23 1933

## 2023-01-25 PROBLEM — A41.9 SEPSIS: Status: ACTIVE | Noted: 2023-01-01

## 2023-01-25 PROBLEM — N17.9 AKI (ACUTE KIDNEY INJURY): Status: ACTIVE | Noted: 2023-01-01

## 2023-01-25 NOTE — ED PROVIDER NOTES
Encounter Date: 1/25/2023       History     Chief Complaint   Patient presents with    Weakness    Tachycardia     Patient presents from the nursing home via EMS with report of general decline, generalized weakness, and worsening jaundice.  Reportedly has a liver mass and has an appointment in 1 week for further evaluation of the liver mass, malignancy may be suspected.  Patient is full code.  Patient has a history of CVA in the past as well as atrial fibrillation with RVR, he is on diltiazem and Eliquis.  Also reportedly had a rapid heart rate.    Review of patient's allergies indicates:  No Known Allergies  Past Medical History:   Diagnosis Date    Age-related nuclear cataract     Anemia, unspecified     Benign prostatic hyperplasia without lower urinary tract symptoms     Cerebral infarct     Constipation     Coronary artery disease     Depression     Essential (primary) hypertension     Liver disease, unspecified     Mixed hyperlipidemia     Paroxysmal atrial fibrillation     Presbyopia      Past Surgical History:   Procedure Laterality Date    right hip       History reviewed. No pertinent family history.  Social History     Tobacco Use    Smoking status: Never    Smokeless tobacco: Never   Substance Use Topics    Alcohol use: Not Currently    Drug use: Never     Review of Systems   Constitutional:  Negative for chills and fever.   HENT: Negative.     Eyes:  Negative for photophobia, pain, discharge, redness, itching and visual disturbance.        Scleral icterus reported   Respiratory: Negative.  Negative for apnea, cough, choking, chest tightness, shortness of breath, wheezing and stridor.    Cardiovascular:  Positive for palpitations (fast heartbeat reported). Negative for chest pain and leg swelling.   Gastrointestinal:  Positive for abdominal pain. Negative for blood in stool, diarrhea, nausea and vomiting.   Genitourinary: Negative.    Musculoskeletal:  Negative for arthralgias, back pain, gait problem,  joint swelling, myalgias, neck pain and neck stiffness.   Skin:  Positive for color change (jaundice reported, worsening). Negative for pallor, rash and wound.   Neurological:  Positive for weakness (reports generalized weakness).   Hematological: Negative.    Psychiatric/Behavioral:  Positive for confusion (has had worsening confusion.).    All other systems reviewed and are negative.    Physical Exam     Initial Vitals [01/25/23 1342]   BP Pulse Resp Temp SpO2   100/68 (!) 149 18 97.3 °F (36.3 °C) 96 %      MAP       --         Physical Exam    Medical Screening Exam   See Full Note    ED Course   Procedures  Labs Reviewed   NT-PRO NATRIURETIC PEPTIDE - Abnormal; Notable for the following components:       Result Value    ProBNP 8,730 (*)     All other components within normal limits   COMPREHENSIVE METABOLIC PANEL - Abnormal; Notable for the following components:    Anion Gap 19 (*)     BUN 61 (*)     Creatinine 2.04 (*)     BUN/Creatinine Ratio 30 (*)     Calcium 8.1 (*)     Total Protein 5.3 (*)     Albumin 2.0 (*)     Bilirubin, Total 8.5 (*)     Alk Phos 469 (*)     ALT 99 (*)      (*)     eGFR 33 (*)     All other components within normal limits   MAGNESIUM - Abnormal; Notable for the following components:    Magnesium 2.7 (*)     All other components within normal limits   URINALYSIS - Abnormal; Notable for the following components:    Leukocytes, UA Small (*)     Glucose,  (*)     Ketones, UA 15 (*)     Urobilinogen, UA 2.0 (*)     Bilirubin, UA Large (*)     Blood, UA Moderate (*)     All other components within normal limits   PROTIME-INR - Abnormal; Notable for the following components:    PT 18.1 (*)     All other components within normal limits   LACTIC ACID, PLASMA - Abnormal; Notable for the following components:    Lactic Acid 2.5 (*)     All other components within normal limits   CBC WITH DIFFERENTIAL - Abnormal; Notable for the following components:    WBC 22.22 (*)     RDW 16.8 (*)      Neutrophils % 91.2 (*)     Lymphocytes % 1.9 (*)     Neutrophils, Abs 20.25 (*)     Lymphocytes, Absolute 0.43 (*)     Eosinophils % 0.0 (*)     Immature Granulocytes % 1.0 (*)     Monocytes, Absolute 1.29 (*)     Immature Granulocytes, Absolute 0.22 (*)     All other components within normal limits   MANUAL DIFFERENTIAL - Abnormal; Notable for the following components:    Segmented Neutrophils, Man % 87 (*)     Lymphocytes, Man % 6 (*)     All other components within normal limits   LACTIC ACID, PLASMA - Abnormal; Notable for the following components:    Lactic Acid 2.2 (*)     All other components within normal limits   AMMONIA - Abnormal; Notable for the following components:    Ammonia <10 (*)     All other components within normal limits   URINALYSIS, MICROSCOPIC - Abnormal; Notable for the following components:    WBC, UA 5-10 (*)     RBC, UA 10-15 (*)     Bacteria, UA Many (*)     Squamous Epithelial Cells, UA Few (*)     All other components within normal limits   RAPID INFLUENZA A/B - Normal   AMYLASE - Normal   LIPASE - Normal   APTT - Normal   CULTURE, BLOOD   CULTURE, BLOOD   CULTURE, URINE   CBC W/ AUTO DIFFERENTIAL    Narrative:     The following orders were created for panel order CBC auto differential.  Procedure                               Abnormality         Status                     ---------                               -----------         ------                     CBC with Differential[[id number]]        Abnormal            Final result               Manual Differential[[id number]]          Abnormal            Final result                 Please view results for these tests on the individual orders.        ECG Results              EKG 12-lead (In process)  Result time 01/25/23 14:10:14      In process by Interface, Lab In [facility] (01/25/23 14:10:14)                   Narrative:    Test Reason : R00.0,    Vent. Rate : 142 BPM     Atrial Rate : 166 BPM     P-R Int : 000 ms          QRS Dur  : 098 ms      QT Int : 324 ms       P-R-T Axes : 000 080 248 degrees     QTc Int : 498 ms    Atrial fibrillation with rapid ventricular response  Septal infarct (cited on or before 10-AURA-2023)  T wave abnormality, consider inferolateral ischemia  Abnormal ECG  When compared with ECG of 10-AURA-2023 17:26,  Atrial fibrillation has replaced Sinus rhythm  Serial changes of evolving Septal infarct Present    Referred By: [email or web address]   SELF           Confirmed By:                                   Imaging Results              US Abdomen Complete (Final result)  Result time 01/25/23 16:17:02      Final result by Harpal Elise MD (01/25/23 16:17:02)                   Impression:      Evidence of widespread hepatic metastatic disease    Suspect cholelithiasis    No abnormal biliary dilatation    Right pleural effusion    Trace ascites      Electronically signed by: Harpal Elise  Date:    01/25/2023  Time:    16:17               Narrative:    EXAMINATION:  Abdominal ultrasound complete    CLINICAL HISTORY:  .  Unspecified jaundice    COMPARISON:  CT abdomen November 18, 2022    TECHNIQUE:  Real-time ultrasound images are captured and archived.    FINDINGS:  Liver measures 16.4 cm length.  The liver has a nodular heterogeneous appearance which is concerning for potential underlying metastatic disease.    There is no abnormal biliary dilatation.  Common bile duct measures 3.6 mm.    There is highly echogenic signal with posterior acoustic shadowing associated with the gallbladder lumen, compatible with gallbladder filled with gallstones.    Pancreas is partially visualized and grossly unremarkable.    Spleen measures 6 cm diameter and is generally unremarkable.    Right kidney measures 8.6 cm length; left kidney measures 6.9 cm.  No renal mass is seen.    There is mild right-sided pleural effusion.  There is trace ascites.    There is no aneurysm of the visualized aorta.  IVC is not well demonstrated.  There is  hepatopedal flow in the portal vein.                                       Medications   sodium chloride 0.9% bolus 1,000 mL 1,000 mL (1,000 mLs Intravenous New Bag 1/25/23 1552)   piperacillin-tazobactam (ZOSYN) 4.5 g in dextrose 5 % in water (D5W) 5 % 100 mL IVPB (MB+) (0 g Intravenous Stopped 1/25/23 1625)   HYDROmorphone injection 0.5 mg (0.5 mg Intravenous Given 1/25/23 1706)   0.9%  NaCl infusion (1,000 mLs Intravenous New Bag 1/25/23 1729)     Medical Decision Making:   History:   I obtained history from: someone other than patient, EMS provider and another health care provider.       <> Summary of History: History obtained from patient, his sister, EMS and previous records.  Sister reports that he has had general decline in status being less responsive and also has become jaundiced.  Reportedly had a scan showing a mass on his liver has an appointment in 1 week for further evaluation of the mass.  Other history from the nursing home is that patient had a positive COVID test there 12 days ago.  Old Medical Records: I decided to obtain old medical records.  Old Records Summarized: other records.       <> Summary of Records: Review of previous lab records indicate patient last had a normal alkaline phosphatase in June 2022 had markedly elevated alkaline phosphatase in the range of 400 in November 2022, along with elevated bilirubin and transaminase levels, subsequent labs showed all of that to have normalized other than alkaline phosphatase remaining elevated but not quite as high as in November.  Differential Diagnosis:   Atrial fibrillation with RVR, sepsis, acute infection to include viral infections such as influenza and COVID, acute on chronic hepatic failure.  Clinical Tests:   Radiological Study: Reviewed  Medical Tests: Reviewed  ED Management:  Patient was given IV Zosyn and IV fluid.  Recommend transfer to higher level of care for specialist evaluation including gastroenterology to evaluate apparent  hepatic failure.  Patient had a negative viral hepatitis panel in November 2022.    Patient was given IV Dilaudid for diffuse abdominal pain.  Other:   I have discussed this case with another health care provider.       <> Summary of the Discussion: Patient was discussed with the hospitalist Dr. Murphy as well as the gastroenterologist Dr. Lewis at [facility] in [place].  Patient accepted by Dr. Murphy.  Awaiting bed placement. EKG reviewed indicates atrial fibrillation with RVR rate 142.  No ST elevation.    Review of radiologist's report for ultrasound of the abdomen indicates probable widespread metastatic disease of the liver with no biliary dilatation noted.                 Clinical Impression:   Final diagnoses:  [R00.0] Tachycardia  [A41.9, R65.20, K72.00] Sepsis with acute liver failure without septic shock, due to unspecified organism, unspecified whether hepatic coma present (Primary)  [N18.32] Stage 3b chronic kidney disease  [R17] Jaundice  [R52] Pain        ED Disposition Condition    Transfer to Another Facility Stable                North Choudhary DO  01/25/23 1938

## 2023-01-25 NOTE — ED TRIAGE NOTES
Pt to [facility] via EMS from local nursing home with c/o weakness and tachycardia. Pt yellow in color and states he is real sick.

## 2023-01-26 PROBLEM — J18.9 PNEUMONIA: Status: ACTIVE | Noted: 2023-01-01

## 2023-01-26 PROBLEM — R06.00 DYSPNEA: Status: ACTIVE | Noted: 2023-01-01

## 2023-01-26 PROBLEM — I50.9 CHF (CONGESTIVE HEART FAILURE): Status: ACTIVE | Noted: 2023-01-01

## 2023-01-26 PROBLEM — I48.91 ATRIAL FIBRILLATION WITH RAPID VENTRICULAR RESPONSE: Status: ACTIVE | Noted: 2023-01-01

## 2023-01-26 NOTE — PLAN OF CARE
[facility] - [address] [facility]  Initial Discharge Assessment       Primary Care Provider: Elliott Fournier DO    Admission Diagnosis: Liver failure [K72.90]    Admission Date: 1/25/2023  Expected Discharge Date:     Discharge Barriers Identified: None    Payor: MEDICARE / Plan: MEDICARE PART A & B / Product Type: Government /     Extended Emergency Contact Information  Primary Emergency Contact: JOSEPH LOVE  Mobile Phone: [phone]  Relation: Relative  Preferred language: English   needed? No  Secondary Emergency Contact: Boo Love  Mobile Phone: [phone]  Relation: Daughter    Discharge Plan A: Return to nursing home  Discharge Plan B: Skilled Nursing Facility      [facility] [place] - [address]  [address] [facility] [address]  Phone: [phone] Fax: [phone]      Initial Assessment (most recent)       Adult Discharge Assessment - 01/26/23 1244          Discharge Assessment    Assessment Type Discharge Planning Assessment     Source of Information family     If unable to respond/provide information was family/caregiver contacted? Yes     Contact Name/Number CarmitaAlex moura [phone] and Boo Love [phone]     People in Home facility resident     Facility Arrived From: [facility]     Do you expect to return to your current living situation? Yes     Equipment Currently Used at Home --   per [facility]    Do you currently have service(s) that help you manage your care at home? No     Do you take prescription medications? Yes     Do you have prescription coverage? Yes     How do you get to doctors appointments? --   nh provides    Are you on dialysis? No     Do you take coumadin? No     Discharge Plan A Return to nursing home     Discharge Plan B Skilled Nursing Facility     DME Needed Upon Discharge  none     Discharge Plan discussed with: Sibling;Adult children     Discharge Barriers Identified None                 Spoke with pts sister carmita  jacky and dtr sarah siu, pt resides at [facility] and choice to return at dc, confirmed with rola at [facility] that pt can return, packet made and placed in [facility], sdoh not complete since pt is a [facility] resident

## 2023-01-26 NOTE — ASSESSMENT & PLAN NOTE
Patient with Long standing persistent (>12 months) atrial fibrillation which is uncontrolled currently with Calcium Channel Blocker. Patient is currently in atrial fibrillation.[id number] Score: 2. HASBLED Score: 4.     - Continue Cardizem  - EKG showed Afib with RVR  - Holding Eliquis 2/2 possible procedure. Would consider VTE prophylaxis in AM.

## 2023-01-26 NOTE — ASSESSMENT & PLAN NOTE
Polycystic liver vs metastasis vs cholangicarcinoma  - Pending HIDA scan  - Pt may benefit from liver biopsy

## 2023-01-26 NOTE — PLAN OF CARE
Problem: Adult Inpatient Plan of Care  Goal: Plan of Care Review  Outcome: Ongoing, Progressing  Goal: Patient-Specific Goal (Individualized)  Outcome: Ongoing, Progressing  Goal: Absence of Hospital-Acquired Illness or Injury  Outcome: Ongoing, Progressing  Goal: Optimal Comfort and Wellbeing  Outcome: Ongoing, Progressing  Goal: Readiness for Transition of Care  Outcome: Ongoing, Progressing     Problem: Skin Injury Risk Increased  Goal: Skin Health and Integrity  Outcome: Ongoing, Progressing     Problem: Adjustment to Illness (Sepsis/Septic Shock)  Goal: Optimal Coping  Outcome: Ongoing, Progressing     Problem: Bleeding (Sepsis/Septic Shock)  Goal: Absence of Bleeding  Outcome: Ongoing, Progressing     Problem: Glycemic Control Impaired (Sepsis/Septic Shock)  Goal: Blood Glucose Level Within Desired Range  Outcome: Ongoing, Progressing     Problem: Infection Progression (Sepsis/Septic Shock)  Goal: Absence of Infection Signs and Symptoms  Outcome: Ongoing, Progressing     Problem: Nutrition Impaired (Sepsis/Septic Shock)  Goal: Optimal Nutrition Intake  Outcome: Ongoing, Progressing

## 2023-01-26 NOTE — H&P
"[facility] - [address] [place] [address] Medicine  History & Physical    Patient Name: Oleksandr Landeros  MRN: [id number]  Patient Class: IP- Inpatient  Admission Date: 1/25/2023  Attending Physician: Branden Jeffrey Jr., MD   Primary Care Provider: Elliott Fournier DO         Patient information was obtained from patient, relative(s) and ER records.     Subjective:     Principal Problem:Sepsis    Chief Complaint:   Chief Complaint   Patient presents with    Shortness of Breath    Abdominal Pain        HPI: Patient is a 76 y/o male transferred to [facility] from [place] with a cc of "Chest Pain." He states the pain was present for about an hour. At that time he was short of breath and his abdomen was hurting.  The pain is located in the upper and lower abdomen it does not radiate. Patient reports he tested positive for Covid 19, 9 days ago. Patient endorses constipation, decreased appetite, fatigue, cough, jaundice.    Patient had recent MRI of  Abdomen 12/19/2022 (per radiology report) Findings which can be seen in metastatic disease although cholangiocarcinoma is also differential concern.  Ultrasound-guided or CT-guided soft tissue sampling is recommended.There is enhancing irregular soft tissue surrounding the gallbladder.     Out[facility] findings include:  Vitals: /68, , RR 18, Temp 97.3 F, O2 96%  Labs :WBC 22.22, HGB 14.6, HCT 44.8, MCV 91.2            Na 140, K 5.1, BUN 61, CR 2.04, Glucose 95, eGFR 33          Alk Phos 469, , ALT 99 Total Bilirubin 8.5, Pro BNP 8730, Baseline 4000          UA WBCs 5-10, RBCs 10-15          BC x 2 pending  Imaging: US Abdomen  (per radiology report) Evidence of widespread hepatic metastatic disease. Suspect cholelithiasis. No abnormal biliary dilatation. Right pleural effusion. Trace ascites.  Interventions:  Approximately 2 L NS, Zosyn, Dilaudid     Patient was subsequently admitted to the hospital medicine " service under the direct supervision of Dr Bernard for further evaluation and management.      Past Medical History:   Diagnosis Date    Age-related nuclear cataract     Anticoagulant long-term use     Arthritis     BPH (benign prostatic hyperplasia)     Coronary artery disease     Essential (primary) hypertension     Hemiparesis affecting left side as late effect of stroke     Mixed hyperlipidemia     Paroxysmal atrial fibrillation     Presbyopia     Stroke        Past Surgical History:   Procedure Laterality Date    JOINT REPLACEMENT Left     hip       Review of patient's allergies indicates:  No Known Allergies    Current Facility-Administered Medications on File Prior to Encounter   Medication    [COMPLETED] 0.9%  NaCl infusion    [COMPLETED] HYDROmorphone injection 0.5 mg    [COMPLETED] piperacillin-tazobactam (ZOSYN) 4.5 g in dextrose 5 % in water (D5W) 5 % 100 mL IVPB (MB+)    [COMPLETED] sodium chloride 0.9% bolus 1,000 mL 1,000 mL     Current Outpatient Medications on File Prior to Encounter   Medication Sig    aluminum & magnesium hydroxide-simethicone (MYLANTA MAX STRENGTH) 400-400-40 mg/5 mL suspension Take by mouth every 6 (six) hours as needed for Indigestion.    apixaban (ELIQUIS) 5 mg Tab Take 5 mg by mouth 2 (two) times daily.    baclofen (LIORESAL) 10 MG tablet Take 10 mg by mouth every 6 to 8 hours as needed.    bisacodyL (DULCOLAX) 5 mg EC tablet Take 5 mg by mouth daily as needed for Constipation.    diltiaZEM (CARDIZEM CD) 240 MG 24 hr capsule Take 1 capsule (240 mg total) by mouth once daily.    hydrALAZINE (APRESOLINE) 25 MG tablet Take 25 mg by mouth 2 (two) times daily.    ibuprofen (ADVIL,MOTRIN) 200 MG tablet Take 400 mg by mouth every 8 (eight) hours as needed for Pain.    isosorbide dinitrate (ISORDIL) 20 MG tablet Take 20 mg by mouth 3 (three) times daily.    senna (SENOKOT) 8.6 mg tablet Take 1 tablet by mouth nightly.    tamsulosin (FLOMAX) 0.4 mg Cap Take by mouth once daily.      Family History    None       Tobacco Use    Smoking status: Never    Smokeless tobacco: Never   Substance and Sexual Activity    Alcohol use: Not Currently    Drug use: Never    Sexual activity: Not Currently     Review of Systems   Constitutional:  Positive for appetite change, fatigue and unexpected weight change. Negative for chills and fever.   HENT:  Negative for congestion, ear pain and hearing loss.    Eyes:  Negative for visual disturbance.        Pt's sister reports pt had yellowing of his sclera   Respiratory:  Positive for cough, chest tightness and shortness of breath.    Cardiovascular:  Positive for chest pain (pressure). Negative for leg swelling.   Gastrointestinal:  Positive for abdominal pain and constipation. Negative for abdominal distention, blood in stool, diarrhea, nausea and vomiting.   Genitourinary:  Positive for difficulty urinating.   Musculoskeletal:  Negative for arthralgias and myalgias.   Skin:  Positive for color change (jaundice). Negative for pallor, rash and wound.   Neurological:  Positive for weakness. Negative for dizziness, seizures, speech difficulty, light-headedness and headaches.   Hematological: Negative.    Psychiatric/Behavioral:  Negative for agitation and confusion.    Objective:     Vital Signs (Most Recent):  Temp: 97.6 °F (36.4 °C) (01/26/23 0000)  Pulse: 82 (01/26/23 0000)  Resp: 18 (01/26/23 0000)  BP: 126/62 (01/26/23 0000)  SpO2: (!) 93 % (01/26/23 0000)   Vital Signs (24h Range):  Temp:  [97.3 °F (36.3 °C)-97.6 °F (36.4 °C)] 97.6 °F (36.4 °C)  Pulse:  [] 82  Resp:  [16-29] 18  SpO2:  [93 %-96 %] 93 %  BP: (100-132)/(51-75) 126/62     Weight: 71.2 kg (157 lb)  Body mass index is 25.35 kg/m².    Physical Exam  Vitals reviewed.   Constitutional:       General: He is not in acute distress.     Appearance: He is ill-appearing.   HENT:      Head: Normocephalic and atraumatic.      Right Ear: External ear normal.      Left Ear: External ear normal.   Eyes:       Extraocular Movements: Extraocular movements intact.      Pupils: Pupils are equal, round, and reactive to light.   Cardiovascular:      Rate and Rhythm: Normal rate and regular rhythm.      Heart sounds: No murmur heard.  Pulmonary:      Effort: No respiratory distress.      Breath sounds: Examination of the right-upper field reveals decreased breath sounds. Examination of the left-upper field reveals rales. Examination of the right-middle field reveals decreased breath sounds. Examination of the left-middle field reveals rales. Examination of the right-lower field reveals decreased breath sounds. Examination of the left-lower field reveals rales. Decreased breath sounds and rales (left sided) present.   Abdominal:      General: Bowel sounds are normal.      Tenderness: There is abdominal tenderness in the epigastric area and suprapubic area. There is no guarding.   Musculoskeletal:      Cervical back: Normal range of motion and neck supple.      Right lower leg: No edema.      Left lower leg: No edema.   Skin:     General: Skin is warm.      Coloration: Skin is not jaundiced.      Findings: No rash or wound.      Comments: Surgical scar right hip   Neurological:      Mental Status: He is alert and oriented to person, place, and time. Mental status is at baseline.      Motor: Weakness (left sided) present.   Psychiatric:         Mood and Affect: Mood normal.         CRANIAL NERVES     CN III, IV, VI   Pupils are equal, round, and reactive to light.     Significant Labs: All pertinent labs within the past 24 hours have been reviewed.  Recent Lab Results  (Last 5 results in the past 24 hours)        01/26/23  0351   01/26/23  0319   01/25/23  1638   01/25/23  1507   01/25/23  1450        Albumin/Globulin Ratio   0.6             Albumin   1.8             Alkaline Phosphatase   439             ALT   92             Ammonia       <10         Anion Gap   19             Appearance, UA         Clear       AST   340              Bacteria, UA         Many       Baso #   0.03             Basophil %   0.2             Bilirubin (UA)         Large       BILIRUBIN TOTAL   8.6             BUN   64             BUN/CREAT RATIO   33             Calcium   7.5             Chloride   104             CO2   18             Color, UA         Yellow       Creatinine   1.96             Differential Type   Auto             eGFR   35             Eos #   0.03             Eosinophil %   0.2             Globulin, Total   2.8             Glucose   66             Glucose, UA         100       Hematocrit   42.4             Hemoglobin   13.7             Immature Grans (Abs)   0.32             Immature Granulocytes   1.6             Ketones, UA         15       Lactate, Abhilash     2.2  Comment: A repeat order for Lactic Acid has been placed for collection in 2 hours.           Leukocytes, UA         Small       Lymph #   0.79             Lymph %   4.0             Magnesium   2.5             MCH   30.0             MCHC   32.3             MCV   92.8             Mono #   1.09             Mono %   5.6             MPV   11.2             Neutrophils, Abs   17.29             Neutrophils Relative   88.4             NITRITE UA         Negative       nRBC   0.0             NT-proBNP 5,359               NUCLEATED RBC ABSOLUTE   0.00             Occult Blood UA         Moderate       pH, UA         5.0       Platelets   137             Potassium   5.2             PROTEIN TOTAL   4.6             Protein, UA         Negative       RBC   4.57             RBC, UA         10-15       RDW   16.7             Sodium   136             Specific [place], UA         1.025       Squam Epithel, UA         Few       TSH   2.200             UROBILINOGEN UA         2.0       WBC, UA         5-10       WBC   19.55                                    Significant Imaging: I have reviewed all pertinent imaging results/findings within the past 24 hours.    Assessment/Plan:     * Sepsis  Antibiotics  given-   Antibiotics (72h ago, onward)      Start     Stop Route Frequency Ordered    01/26/23 0100  cefTRIAXone (ROCEPHIN) 1 g in dextrose 5 % in water (D5W) 5 % 50 mL IVPB (MB+)         -- IV Every 24 hours (non-standard times) 01/25/23 2352    01/26/23 0100  metronidazole IVPB 500 mg         -- IV Every 8 hours (non-standard times) 01/25/23 2352          Latest lactate reviewed-  Recent Labs   Lab 01/25/23  1400 01/25/23  1638   LACTATE 2.5* 2.2*     Organ dysfunction indicated by Acute kidney injury, Acute liver injury and Acute heart failure    Fluid challenge Other- Patient to receive 2 L volume other than 30cc/kg due to Volume overload due to- Presumed CHF     Post- resuscitation assessment Yes Perfusion exam was performed within 6 hours of septic shock presentation after bolus shows Adequate tissue perfusion assessed by non-invasive monitoring       Will Not start Pressors- Levophed for MAP of 65  Source control achieved by: Antibiotics    - Suspected source includes Pneumonia vs UTI vs Cholecystitis   - qSOFA score: 0 points  - 2 SIRS criteria met:  and WBC 22.22  - LR 1 L IV at 250 mL/hr x1  - BC x2 pending (at outlying facility)  - Empiric Rocephin and Metronidazole    - CXR, KUB and HIDA scan pending      Paroxysmal atrial fibrillation  Patient with Long standing persistent (>12 months) atrial fibrillation which is uncontrolled currently with Calcium Channel Blocker. Patient is currently in atrial fibrillation.[id number] Score: 2. HASBLED Score: 4.     - Continue Cardizem  - EKG showed Afib with RVR  - Holding Eliquis 2/2 possible procedure. Would consider VTE prophylaxis in AM.    CHF (congestive heart failure)  - Cannot rule out new onset heart failure  - BNP pending, baseline BNP 1 month ago was 8000  - Echo pending  -Troponin pending  - Monitor fluid status    BPH (benign prostatic hyperplasia)  - Continue Flomax      Polycystic liver disease  Polycystic liver vs metastasis vs  cholangicarcinoma  - Pending HIDA scan  - Pt may benefit from liver biopsy         VTE Risk Mitigation (From admission, onward)      Consider pharmacologic anticoagulation if no procedure indicated               Mayur Knowles MD  Department of Hospital Medicine   [facility] - [address] [place] [address]

## 2023-01-26 NOTE — ASSESSMENT & PLAN NOTE
CXR reveals potential pneumonia on right side. Patient has been in nursing facility for 5 years now, will treat as HAP due to these concerns with Zosyn and Azithryomycin.

## 2023-01-26 NOTE — ASSESSMENT & PLAN NOTE
This patient does not have evidence of infective focus  My overall impression is sepsis.  Source: Unknown  Antibiotics given-   Antibiotics (72h ago, onward)    Start     Stop Route Frequency Ordered    01/26/23 0100  cefTRIAXone (ROCEPHIN) 1 g in dextrose 5 % in water (D5W) 5 % 50 mL IVPB (MB+)         -- IV Every 24 hours (non-standard times) 01/25/23 2352    01/26/23 0100  metronidazole IVPB 500 mg         -- IV Every 8 hours (non-standard times) 01/25/23 2352        Latest lactate reviewed-  Recent Labs   Lab 01/25/23  1400 01/25/23  1638   LACTATE 2.5* 2.2*     Organ dysfunction indicated by Acute kidney injury, Acute liver injury and Acute heart failure    Fluid challenge Other- Patient to receive 2 L volume other than 30cc/kg due to Volume overload due to- Presumed CHF     Post- resuscitation assessment Yes Perfusion exam was performed within 6 hours of septic shock presentation after bolus shows Adequate tissue perfusion assessed by non-invasive monitoring       Will Not start Pressors- Levophed for MAP of 65  Source control achieved by: Antibiotics    - Suspected source includes Pneumonia vs UTI vs Cholecystitis   - qSOFA score: 0 points  - 2 SIRS criteria met:  and WBC 22.22  - LR 1 L IV at 250 mL/hr x1  - BC x2 pending (at outlying facility)  - Empiric Rocephin and Metronidazole    - CXR, KUB and HIDA scan pending

## 2023-01-26 NOTE — ED NOTES
Attempted to call report to [place] [phone] was put on hold and was told someone else was assigned to room 556. The nurse asked for me to call back in about 15 minutes for them to get it straightened out.

## 2023-01-26 NOTE — ED NOTES
EMS arrived [facility]. Report given to Brett he verbalized understanding.

## 2023-01-26 NOTE — HPI
"Patient is a 76 y/o male transferred to [facility] from [place] with a cc of "Chest Pain." He states the pain was present for about an hour. At that time he was short of breath and his abdomen was hurting.  The pain is located in the upper and lower abdomen it does not radiate. Patient reports he tested positive for Covid 19, 9 days ago. Patient endorses constipation, decreased appetite, fatigue, cough, jaundice.    Patient had recent MRI of  Abdomen 12/19/2022 (per radiology report) Findings which can be seen in metastatic disease although cholangiocarcinoma is also differential concern.  Ultrasound-guided or CT-guided soft tissue sampling is recommended.There is enhancing irregular soft tissue surrounding the gallbladder.     [facility] findings include:  Vitals: /68, , RR 18, Temp 97.3 F, O2 96%  Labs :WBC 22.22, HGB 14.6, HCT 44.8, MCV 91.2            Na 140, K 5.1, BUN 61, CR 2.04, Glucose 95, eGFR 33          Alk Phos 469, , ALT 99 Total Bilirubin 8.5, Pro BNP 8730, Baseline 4000          UA WBCs 5-10, RBCs 10-15          BC x 2 pending  Imaging: US Abdomen  (per radiology report) Evidence of widespread hepatic metastatic disease. Suspect cholelithiasis. No abnormal biliary dilatation. Right pleural effusion. Trace ascites.  Interventions:  Approximately 2 L NS, Zosyn, Dilaudid     Patient was subsequently admitted to the hospital medicine service under the direct supervision of Dr Bernard for further evaluation and management.  "

## 2023-01-26 NOTE — ASSESSMENT & PLAN NOTE
Patient with Long standing persistent (>12 months) atrial fibrillation which is uncontrolled currently with Calcium Channel Blocker. Patient is currently in atrial fibrillation.[id number] Score: 2. HASBLED Score: 4.     - Continue Cardizem  - EKG showed Afib with RVR  - Holding Eliquis 2/2 possible procedure. Would consider VTE prophylaxis in AM.

## 2023-01-26 NOTE — ED NOTES
Tanya from [facility] called back and stated bed coordinator didn't hold the room 556 for my patient and my patient was given another room 553

## 2023-01-26 NOTE — ASSESSMENT & PLAN NOTE
- Cannot rule out new onset heart failure  - BNP pending, baseline BNP 1 month ago was 8000  - Echo pending  -Troponin pending  - Monitor fluid status

## 2023-01-26 NOTE — SUBJECTIVE & OBJECTIVE
Interval History: Pt was resting comfortably in bed this AM. At present, he endorses chest tightness and dyspnea and denies palpitations. Upon palpation, the patient had significant subxiphoid/epigastric tenderness, and diffuse lower abdominal tenderness. He denies any previous episodes of chest pain. CXR suggestive of possible PNA or atelectasis. KUB findings suggestive of mild ileus and atherosclerotic calcification. ECHO performed, results pending. Telemetry shows HR of 108 with atrial fibrillation.    Review of Systems   Constitutional:  Negative for chills and fever.   HENT:  Positive for congestion. Negative for hearing loss.    Eyes:  Negative for visual disturbance.   Respiratory:  Positive for cough, chest tightness and shortness of breath.    Cardiovascular:  Positive for chest pain. Negative for palpitations and leg swelling.   Gastrointestinal:  Positive for nausea. Negative for abdominal pain, constipation, diarrhea and vomiting.   Genitourinary:  Negative for hematuria.   Musculoskeletal:  Negative for back pain.   Neurological:  Negative for dizziness, light-headedness and headaches.   Objective:     Vital Signs (Most Recent):  Temp: 97.8 °F (36.6 °C) (01/26/23 0714)  Pulse: 97 (01/26/23 0714)  Resp: 18 (01/26/23 0714)  BP: 96/69 (01/26/23 0714)  SpO2: 95 % (01/26/23 0714) Vital Signs (24h Range):  Temp:  [97.3 °F (36.3 °C)-97.9 °F (36.6 °C)] 97.8 °F (36.6 °C)  Pulse:  [] 97  Resp:  [16-29] 18  SpO2:  [93 %-96 %] 95 %  BP: ()/(51-75) 96/69     Weight: 71.2 kg (157 lb)  Body mass index is 25.35 kg/m².    Intake/Output Summary (Last 24 hours) at 1/26/2023 0818  Last data filed at 1/26/2023 0513  Gross per 24 hour   Intake --   Output 200 ml   Net -200 ml      Physical Exam  Vitals reviewed.   Constitutional:       General: He is not in acute distress.     Appearance: Normal appearance. He is not ill-appearing.   HENT:      Head: Normocephalic and atraumatic.      Right Ear: External ear  normal.      Left Ear: External ear normal.      Nose: Nose normal.      Mouth/Throat:      Mouth: Mucous membranes are dry.      Pharynx: Oropharynx is clear.   Eyes:      General: Scleral icterus present.      Extraocular Movements: Extraocular movements intact.      Conjunctiva/sclera: Conjunctivae normal.      Pupils: Pupils are equal, round, and reactive to light.   Cardiovascular:      Rate and Rhythm: Regular rhythm. Tachycardia present.      Heart sounds: Normal heart sounds. No murmur heard.  Pulmonary:      Effort: Pulmonary effort is normal. No respiratory distress.      Breath sounds: Decreased breath sounds and rales present. No wheezing.   Abdominal:      General: Bowel sounds are normal.      Palpations: Abdomen is soft.      Tenderness: There is generalized abdominal tenderness and tenderness in the epigastric area and suprapubic area. There is no guarding.      Comments: Subxiphoid tenderness present    Musculoskeletal:         General: No swelling.      Cervical back: Normal range of motion and neck supple.      Right lower leg: No edema.      Left lower leg: No edema.   Skin:     General: Skin is warm and dry.      Coloration: Skin is jaundiced (slight).      Findings: No rash or wound.      Comments: Surgical scar right hip   Neurological:      General: No focal deficit present.      Mental Status: He is alert and oriented to person, place, and time. Mental status is at baseline.      Sensory: Sensation is intact.      Motor: Weakness (left sided) present.   Psychiatric:         Mood and Affect: Mood normal.       Significant Labs: All pertinent labs within the past 24 hours have been reviewed.    Significant Imaging: I have reviewed all pertinent imaging results/findings within the past 24 hours.

## 2023-01-26 NOTE — ED NOTES
RN called [facility] back and spoke with Tanya and told her that another patient was assigned to [id number] she stated she would call and find out

## 2023-01-26 NOTE — ASSESSMENT & PLAN NOTE
Antibiotics given-   Antibiotics (72h ago, onward)    Start     Stop Route Frequency Ordered    01/26/23 0100  cefTRIAXone (ROCEPHIN) 1 g in dextrose 5 % in water (D5W) 5 % 50 mL IVPB (MB+)         -- IV Every 24 hours (non-standard times) 01/25/23 2352    01/26/23 0100  metronidazole IVPB 500 mg         -- IV Every 8 hours (non-standard times) 01/25/23 2352        Latest lactate reviewed-  Recent Labs   Lab 01/25/23  1400 01/25/23  1638   LACTATE 2.5* 2.2*     Organ dysfunction indicated by Acute kidney injury, Acute liver injury and Acute heart failure    Fluid challenge Other- Patient to receive 2 L volume other than 30cc/kg due to Volume overload due to- Presumed CHF     Post- resuscitation assessment Yes Perfusion exam was performed within 6 hours of septic shock presentation after bolus shows Adequate tissue perfusion assessed by non-invasive monitoring       Will Not start Pressors- Levophed for MAP of 65  Source control achieved by: Antibiotics    - Suspected source includes Pneumonia vs UTI vs Cholecystitis   - qSOFA score: 0 points  - Initial 2 SIRS criteria met:  and WBC 22.22  - LR 1 L IV at 250 mL/hr x1  - BC x2 pending (at outlying facility)  - D/C Empiric Rocephin and Metronidazole due to tx of possible PNA  - CXR demonstrates predominantly right sided basilar atelectasis/infiltrate and mild bilateral pleural effusion  -IR consult to thoracentesis pleural effusion for therapeutic and diagnositic benefit. Cell count, culture, cytology, LDH, Protein.   -KUB suggestive of mild ileus due to gaseous distension of small bowel.  -HIDA scan pending  - Will start on zosyn, azithromycin for PNA Abx

## 2023-01-26 NOTE — ASSESSMENT & PLAN NOTE
- Cannot rule out new onset heart failure  - BNP 5359   - Echo pending  -Troponin WNL 31.8  - Monitor fluid status

## 2023-01-26 NOTE — PROGRESS NOTES
[facility] - [address] [place] [address]  Adult Nutrition  First Assessment Note         Reason for Assessment  Reason For Assessment: identified at risk by screening criteria (MST score of 3: unsure recent weight loss without trying and reduced PO intake d/t poor appetite)   Nutrition Risk Screen: no indicators present     Patient seen for MST. RD spoke with patient and family member at bed side. He is a weight loss of 4.2% x 2 months- not significant. He has a reported appetite decrease ~ x 1 week prior to admission.     Patient continues to reports that he has no appetite. Recommend appetite stimulant if appropriate. He is on a cardiac diet. He has snacks at bedside and reports he may snack some during the day. He agreed to try to drink a  supplement with meals.     Patient is at risk for malnutrition due to poor appetite with decreased intake.       Malnutrition  Is Patient Malnourished: No  Skin Integrity  Kam Risk Assessment  Sensory Perception: 3-->slightly limited  Moisture: 4-->rarely moist  Activity: 1-->bedfast  Mobility: 2-->very limited  Nutrition: 2-->probably inadequate  Friction and Shear: 2-->potential problem  Kam Score: 14    Nutrition Diagnosis  Altered Nutrition Related Lab Values   related to [facility] illness as evidenced by sepsis and liver disease    Nutrition Diagnosis Status: Chronic/ continues      Comments: poor intake  Nutrition Risk  Level of Risk/Frequency of Follow-up: moderate - high  Comments on nutrition risk: due to poor appetite   Recent Labs   Lab 01/26/23  0319   GLU 66*     Comments on Glucose: no dx of diabetes  Nutrition Prescription / Recommendations  Recommendation/Intervention: Recommend addition of Ensure Max Protein-strawberry due to decreased intake.  Goals: weight maintenance, acceptance of supplements  Nutrition Goal Status: new  Current Diet Order: Cardiac Diet  Chewing or Swallowing Difficulty?: No Chewing or swallowing difficulty  Recommended Diet: Low  Sodium ( 2g Sodium)  Recommended Oral Supplement: Ensure Max Protein [150 kcals, 30g Protein, 6g Carbs(2g Fiber, 1g Sugar), 1.5g Fat] three times daily  Is Nutrition Support Recommended: No  Is Education Recommended: No  Monitor and Evaluation  % current Intake: P.O. intake of 0 - 10%  % intake to meet estimated needs: P.O. + Supplements  Food and Nutrient Intake: energy intake, food and beverage intake  Food and Nutrient Adminstration: diet order  Anthropometric Measurements: weight, weight change, body mass index  Biochemical Data, Medical Tests and Procedures: electrolyte and renal panel, gastrointestinal profile, glucose/endocrine profile, inflammatory profile, lipid profile  Nutrition-Focused Physical Findings: overall appearance, extremities, muscles and bones, head and eyes, skin     Current Medical Diagnosis and Past Medical History  Diagnosis: infection/sepsis  Past Medical History:   Diagnosis Date    Age-related nuclear cataract     Anticoagulant long-term use     Arthritis     BPH (benign prostatic hyperplasia)     Coronary artery disease     Essential (primary) hypertension     Hemiparesis affecting left side as late effect of stroke     Mixed hyperlipidemia     Paroxysmal atrial fibrillation     Presbyopia     Stroke      Nutrition/Diet History  Food Allergies: NKFA  Factors Affecting Nutritional Intake: None identified at this time  Lab/Procedures/Meds  Recent Labs   Lab 01/26/23  0319      K 5.2*   BUN 64*   CREATININE 1.96*   CALCIUM 7.5*   ALBUMIN 1.8*      ALT 92*   *     Last A1c: No results found for: HGBA1C  Lab Results   Component Value Date    RBC 4.57 (L) 01/26/2023    HGB 13.7 01/26/2023    HCT 42.4 01/26/2023    MCV 92.8 01/26/2023    MCH 30.0 01/26/2023    MCHC 32.3 01/26/2023    TIBC 167 (L) 11/05/2022     Pertinent Labs Reviewed: reviewed  Pertinent Labs Comments: K 5.2 (H) - replete to WNL as needed, CO2 18 (L),  Anion gap 19 (H), GLU 66 (L) - pt with no  "PMH of DM,   BUN 64 (H), creatinine 1.96 (H), BUN/CREAT RATIO 33 (H), eGFR 35 (L), Ca 7.5 (L), Mg 2.5 (H) - pt with no PMH of CKD, will continue to monitor renal labs, alk phos 439 (H),   Total protein 4.6 (L), albumin 1.8 (L) - pt with reported reduced appetite/PO intake, total bilirubin 8.6 (H), APJ065 (H), ALT 92 (H) - altered labs likely r/t pt with sepsis  Pertinent Medications Reviewed: reviewed  Pertinent Medications Comments: Albuterol-ipratropium, rocephin, diltiazem, metronidazole, tamsulosin  Anthropometrics  Temp: 97.8 °F (36.6 °C)  Height: 5' 5.98" (167.6 cm)  Height (inches): 65.98 in  Weight Method: Bed Scale  Weight: 71.2 kg (157 lb)  Weight (lb): 157 lb  Ideal Body Weight (IBW), Male: 141.88 lb  % Ideal Body Weight, Male (lb): 110.66 %  BMI (Calculated): 25.4     Estimated/Assessed Needs      Temp: 97.8 °F (36.6 °C)Oral  Weight Used For Calorie Calculations: 71.2 kg (156 lb 15.5 oz)   Energy Need Method: Kcal/kg (30-35kcal/kg) Energy Calorie Requirements (kcal): 2136-2492kcal  Weight Used For Protein Calculations: 71.2 kg (156 lb 15.5 oz)  Protein Requirements: 107-143g pro (1.5-2.0g pro/kg d/t sepsis)       RDA Method (mL): 2136     Nutrition by Nursing              Last Bowel Movement: 01/23/23              Nutrition Follow-Up  RD Follow-up?: Yes    "

## 2023-01-26 NOTE — PROGRESS NOTES
"[facility] - [facility] Tele[facility] Hospital Medicine  Progress Note    Patient Name: Oleksandr Landeros  MRN: [id number]  Patient Class: IP- Inpatient   Admission Date: 1/25/2023  Length of Stay: 1 days  Attending Physician: Branden Jeffrey Jr., MD  Primary Care Provider: Elliott Fournier DO        Subjective:     Principal Problem:Sepsis        HPI:  Patient is a 78 y/o male transferred to [facility] from [place] with a cc of "Chest Pain." He states the pain was present for about an hour. At that time he was short of breath and his abdomen was hurting.  The pain is located in the upper and lower abdomen it does not radiate. Patient reports he tested positive for Covid 19, 9 days ago. Patient endorses constipation, decreased appetite, fatigue, cough, jaundice.    Patient had recent MRI of  Abdomen 12/19/2022 (per radiology report) Findings which can be seen in metastatic disease although cholangiocarcinoma is also differential concern.  Ultrasound-guided or CT-guided soft tissue sampling is recommended.There is enhancing irregular soft tissue surrounding the gallbladder.     Outlying [facility] findings include:  Vitals: /68, , RR 18, Temp 97.3 F, O2 96%  Labs :WBC 22.22, HGB 14.6, HCT 44.8, MCV 91.2            Na 140, K 5.1, BUN 61, CR 2.04, Glucose 95, eGFR 33          Alk Phos 469, , ALT 99 Total Bilirubin 8.5, Pro BNP 8730, Baseline 4000          UA WBCs 5-10, RBCs 10-15          BC x 2 pending  Imaging: US Abdomen  (per radiology report) Evidence of widespread hepatic metastatic disease. Suspect cholelithiasis. No abnormal biliary dilatation. Right pleural effusion. Trace ascites.  Interventions:  Approximately 2 L NS, Zosyn, Dilaudid     Patient was subsequently admitted to the hospital medicine service under the direct supervision of Dr Bernard for further evaluation and management.      Overview/Hospital Course:  No notes on file    Interval History: Pt " was resting comfortably in bed this AM. At present, he endorses chest tightness and dyspnea and denies palpitations. Upon palpation, the patient had significant subxiphoid/epigastric tenderness, and diffuse lower abdominal tenderness. He denies any previous episodes of chest pain. CXR suggestive of possible PNA or atelectasis. KUB findings suggestive of mild ileus and atherosclerotic calcification. ECHO performed, results pending. Telemetry shows HR of 108 with atrial fibrillation.    Review of Systems   Constitutional:  Negative for chills and fever.   HENT:  Positive for congestion. Negative for hearing loss.    Eyes:  Negative for visual disturbance.   Respiratory:  Positive for cough, chest tightness and shortness of breath.    Cardiovascular:  Positive for chest pain. Negative for palpitations and leg swelling.   Gastrointestinal:  Positive for nausea. Negative for abdominal pain, constipation, diarrhea and vomiting.   Genitourinary:  Negative for hematuria.   Musculoskeletal:  Negative for back pain.   Neurological:  Negative for dizziness, light-headedness and headaches.   Objective:     Vital Signs (Most Recent):  Temp: 97.8 °F (36.6 °C) (01/26/23 0714)  Pulse: 97 (01/26/23 0714)  Resp: 18 (01/26/23 0714)  BP: 96/69 (01/26/23 0714)  SpO2: 95 % (01/26/23 0714) Vital Signs (24h Range):  Temp:  [97.3 °F (36.3 °C)-97.9 °F (36.6 °C)] 97.8 °F (36.6 °C)  Pulse:  [] 97  Resp:  [16-29] 18  SpO2:  [93 %-96 %] 95 %  BP: ()/(51-75) 96/69     Weight: 71.2 kg (157 lb)  Body mass index is 25.35 kg/m².    Intake/Output Summary (Last 24 hours) at 1/26/2023 0818  Last data filed at 1/26/2023 0513  Gross per 24 hour   Intake --   Output 200 ml   Net -200 ml      Physical Exam  Vitals reviewed.   Constitutional:       General: He is not in acute distress.     Appearance: Normal appearance. He is not ill-appearing.   HENT:      Head: Normocephalic and atraumatic.      Right Ear: External ear normal.      Left Ear:  External ear normal.      Nose: Nose normal.      Mouth/Throat:      Mouth: Mucous membranes are dry.      Pharynx: Oropharynx is clear.   Eyes:      General: Scleral icterus present.      Extraocular Movements: Extraocular movements intact.      Conjunctiva/sclera: Conjunctivae normal.      Pupils: Pupils are equal, round, and reactive to light.   Cardiovascular:      Rate and Rhythm: Regular rhythm. Tachycardia present.      Heart sounds: Normal heart sounds. No murmur heard.  Pulmonary:      Effort: Pulmonary effort is normal. No respiratory distress.      Breath sounds: Decreased breath sounds and rales present. No wheezing.   Abdominal:      General: Bowel sounds are normal.      Palpations: Abdomen is soft.      Tenderness: There is generalized abdominal tenderness and tenderness in the epigastric area and suprapubic area. There is no guarding.      Comments: Subxiphoid tenderness present    Musculoskeletal:         General: No swelling.      Cervical back: Normal range of motion and neck supple.      Right lower leg: No edema.      Left lower leg: No edema.   Skin:     General: Skin is warm and dry.      Coloration: Skin is jaundiced (slight).      Findings: No rash or wound.      Comments: Surgical scar right hip   Neurological:      General: No focal deficit present.      Mental Status: He is alert and oriented to person, place, and time. Mental status is at baseline.      Sensory: Sensation is intact.      Motor: Weakness (left sided) present.   Psychiatric:         Mood and Affect: Mood normal.       Significant Labs: All pertinent labs within the past 24 hours have been reviewed.    Significant Imaging: I have reviewed all pertinent imaging results/findings within the past 24 hours.      Assessment/Plan:      * Sepsis  Antibiotics given-   Antibiotics (72h ago, onward)    Start     Stop Route Frequency Ordered    01/26/23 0100  cefTRIAXone (ROCEPHIN) 1 g in dextrose 5 % in water (D5W) 5 % 50 mL IVPB  (MB+)         -- IV Every 24 hours (non-standard times) 01/25/23 2352    01/26/23 0100  metronidazole IVPB 500 mg         -- IV Every 8 hours (non-standard times) 01/25/23 2352        Latest lactate reviewed-  Recent Labs   Lab 01/25/23  1400 01/25/23  1638   LACTATE 2.5* 2.2*     Organ dysfunction indicated by Acute kidney injury, Acute liver injury and Acute heart failure    Fluid challenge Other- Patient to receive 2 L volume other than 30cc/kg due to Volume overload due to- Presumed CHF     Post- resuscitation assessment Yes Perfusion exam was performed within 6 hours of septic shock presentation after bolus shows Adequate tissue perfusion assessed by non-invasive monitoring       Will Not start Pressors- Levophed for MAP of 65  Source control achieved by: Antibiotics    - Suspected source includes Pneumonia vs UTI vs Cholecystitis   - qSOFA score: 0 points  - Initial 2 SIRS criteria met:  and WBC 22.22  - LR 1 L IV at 250 mL/hr x1  - BC x2 pending (at outlying facility)  - D/C Empiric Rocephin and Metronidazole due to tx of possible PNA  - CXR demonstrates predominantly right sided basilar atelectasis/infiltrate and mild bilateral pleural effusion  -IR consult to thoracentesis pleural effusion for therapeutic and diagnositic benefit. Cell count, culture, cytology, LDH, Protein.   -KUB suggestive of mild ileus due to gaseous distension of small bowel.  -HIDA scan pending  - Will start on zosyn, azithromycin for PNA Abx      Pneumonia  CXR reveals potential pneumonia on right side. Patient has been in nursing facility for 5 years now, will treat as HAP due to these concerns with Zosyn and Azithryomycin.         CHF (congestive heart failure)  - Cannot rule out new onset heart failure  - BNP 5359   - Echo pending  -Troponin WNL 31.8  - Monitor fluid status    Polycystic liver disease  Polycystic liver vs metastasis vs cholangicarcinoma  - Pending HIDA scan  - Pt may benefit from liver biopsy       BPH  (benign prostatic hyperplasia)  - Continue Flomax      Paroxysmal atrial fibrillation  Patient with Long standing persistent (>12 months) atrial fibrillation which is uncontrolled currently with Calcium Channel Blocker. Patient is currently in atrial fibrillation.[id number] Score: 2. HASBLED Score: 4.     - Continue Cardizem  - EKG showed Afib with RVR  - Holding Eliquis 2/2 possible procedure. Would consider VTE prophylaxis in AM.    VTE Risk Mitigation (From admission, onward)    None          Discharge Planning   ELENA:      Code Status: Full Code   Is the patient medically ready for discharge?:     Reason for patient still in hospital (select all that apply): Treatment                     Orestes Wood DO  Department of Hospital Medicine   [facility] - [facility] Tele[facility]

## 2023-01-26 NOTE — PROGRESS NOTES
Thoracentesis  Performed by A Keo A  Consent obtained for thoracentesis.  A formal timeout was called all staff present agreed to patient and procedure.  Initial ultrasound images pre thoracentesis show a slightly larger effusion on the left.  The left posterior thorax was prepped with ChloraPrep and sterile field was established.  1% lidocaine was used as local anesthetic.  Under ultrasound guidance a 6 [place] centesis catheter was placed into the left pleural space.  200 mL dark yellow pleural fluid was removed.  The catheter was then removed and the puncture site was cleaned and bandaged.  The patient tolerated the procedure well and there were no immediate postprocedure complications.  Patient will be transferred to Diagnostic Radiology for post thoracentesis chest x-ray.

## 2023-01-26 NOTE — SUBJECTIVE & OBJECTIVE
Past Medical History:   Diagnosis Date    Age-related nuclear cataract     Anticoagulant long-term use     Arthritis     BPH (benign prostatic hyperplasia)     Coronary artery disease     Essential (primary) hypertension     Hemiparesis affecting left side as late effect of stroke     Mixed hyperlipidemia     Paroxysmal atrial fibrillation     Presbyopia     Stroke        Past Surgical History:   Procedure Laterality Date    JOINT REPLACEMENT Left     hip       Review of patient's allergies indicates:  No Known Allergies    Current Facility-Administered Medications on File Prior to Encounter   Medication    [COMPLETED] 0.9%  NaCl infusion    [COMPLETED] HYDROmorphone injection 0.5 mg    [COMPLETED] piperacillin-tazobactam (ZOSYN) 4.5 g in dextrose 5 % in water (D5W) 5 % 100 mL IVPB (MB+)    [COMPLETED] sodium chloride 0.9% bolus 1,000 mL 1,000 mL     Current Outpatient Medications on File Prior to Encounter   Medication Sig    aluminum & magnesium hydroxide-simethicone (MYLANTA MAX STRENGTH) 400-400-40 mg/5 mL suspension Take by mouth every 6 (six) hours as needed for Indigestion.    apixaban (ELIQUIS) 5 mg Tab Take 5 mg by mouth 2 (two) times daily.    baclofen (LIORESAL) 10 MG tablet Take 10 mg by mouth every 6 to 8 hours as needed.    bisacodyL (DULCOLAX) 5 mg EC tablet Take 5 mg by mouth daily as needed for Constipation.    diltiaZEM (CARDIZEM CD) 240 MG 24 hr capsule Take 1 capsule (240 mg total) by mouth once daily.    hydrALAZINE (APRESOLINE) 25 MG tablet Take 25 mg by mouth 2 (two) times daily.    ibuprofen (ADVIL,MOTRIN) 200 MG tablet Take 400 mg by mouth every 8 (eight) hours as needed for Pain.    isosorbide dinitrate (ISORDIL) 20 MG tablet Take 20 mg by mouth 3 (three) times daily.    senna (SENOKOT) 8.6 mg tablet Take 1 tablet by mouth nightly.    tamsulosin (FLOMAX) 0.4 mg Cap Take by mouth once daily.     Family History    None       Tobacco Use    Smoking status: Never    Smokeless tobacco: Never    Substance and Sexual Activity    Alcohol use: Not Currently    Drug use: Never    Sexual activity: Not Currently     Review of Systems   Constitutional:  Positive for appetite change, fatigue and unexpected weight change. Negative for chills and fever.   HENT:  Negative for congestion, ear pain and hearing loss.    Eyes:  Negative for visual disturbance.        Pt's sister reports pt had yellowing of his sclera   Respiratory:  Positive for cough, chest tightness and shortness of breath.    Cardiovascular:  Positive for chest pain (pressure). Negative for leg swelling.   Gastrointestinal:  Positive for abdominal pain and constipation. Negative for abdominal distention, blood in stool, diarrhea, nausea and vomiting.   Genitourinary:  Positive for difficulty urinating.   Musculoskeletal:  Negative for arthralgias and myalgias.   Skin:  Positive for color change (jaundice). Negative for pallor, rash and wound.   Neurological:  Positive for weakness. Negative for dizziness, seizures, speech difficulty, light-headedness and headaches.   Hematological: Negative.    Psychiatric/Behavioral:  Negative for agitation and confusion.    Objective:     Vital Signs (Most Recent):  Temp: 97.6 °F (36.4 °C) (01/26/23 0000)  Pulse: 82 (01/26/23 0000)  Resp: 18 (01/26/23 0000)  BP: 126/62 (01/26/23 0000)  SpO2: (!) 93 % (01/26/23 0000)   Vital Signs (24h Range):  Temp:  [97.3 °F (36.3 °C)-97.6 °F (36.4 °C)] 97.6 °F (36.4 °C)  Pulse:  [] 82  Resp:  [16-29] 18  SpO2:  [93 %-96 %] 93 %  BP: (100-132)/(51-75) 126/62     Weight: 71.2 kg (157 lb)  Body mass index is 25.35 kg/m².    Physical Exam  Vitals reviewed.   Constitutional:       General: He is not in acute distress.     Appearance: He is ill-appearing.   HENT:      Head: Normocephalic and atraumatic.      Right Ear: External ear normal.      Left Ear: External ear normal.   Eyes:      Extraocular Movements: Extraocular movements intact.      Pupils: Pupils are equal, round,  and reactive to light.   Cardiovascular:      Rate and Rhythm: Normal rate and regular rhythm.      Heart sounds: No murmur heard.  Pulmonary:      Effort: No respiratory distress.      Breath sounds: Examination of the right-upper field reveals decreased breath sounds. Examination of the left-upper field reveals rales. Examination of the right-middle field reveals decreased breath sounds. Examination of the left-middle field reveals rales. Examination of the right-lower field reveals decreased breath sounds. Examination of the left-lower field reveals rales. Decreased breath sounds and rales (left sided) present.   Abdominal:      General: Bowel sounds are normal.      Tenderness: There is abdominal tenderness in the epigastric area and suprapubic area. There is no guarding.   Musculoskeletal:      Cervical back: Normal range of motion and neck supple.      Right lower leg: No edema.      Left lower leg: No edema.   Skin:     General: Skin is warm.      Coloration: Skin is not jaundiced.      Findings: No rash or wound.      Comments: Surgical scar right hip   Neurological:      Mental Status: He is alert and oriented to person, place, and time. Mental status is at baseline.      Motor: Weakness (left sided) present.   Psychiatric:         Mood and Affect: Mood normal.         CRANIAL NERVES     CN III, IV, VI   Pupils are equal, round, and reactive to light.     Significant Labs: All pertinent labs within the past 24 hours have been reviewed.  Recent Lab Results  (Last 5 results in the past 24 hours)        01/26/23  0351   01/26/23  0319   01/25/23  1638   01/25/23  1507   01/25/23  1450        Albumin/Globulin Ratio   0.6             Albumin   1.8             Alkaline Phosphatase   439             ALT   92             Ammonia       <10         Anion Gap   19             Appearance, UA         Clear       AST   340             Bacteria, UA         Many       Baso #   0.03             Basophil %   0.2              Bilirubin (UA)         Large       BILIRUBIN TOTAL   8.6             BUN   64             BUN/CREAT RATIO   33             Calcium   7.5             Chloride   104             CO2   18             Color, UA         Yellow       Creatinine   1.96             Differential Type   Auto             eGFR   35             Eos #   0.03             Eosinophil %   0.2             Globulin, Total   2.8             Glucose   66             Glucose, UA         100       Hematocrit   42.4             Hemoglobin   13.7             Immature Grans (Abs)   0.32             Immature Granulocytes   1.6             Ketones, UA         15       Lactate, Abhilash     2.2  Comment: A repeat order for Lactic Acid has been placed for collection in 2 hours.           Leukocytes, UA         Small       Lymph #   0.79             Lymph %   4.0             Magnesium   2.5             MCH   30.0             MCHC   32.3             MCV   92.8             Mono #   1.09             Mono %   5.6             MPV   11.2             Neutrophils, Abs   17.29             Neutrophils Relative   88.4             NITRITE UA         Negative       nRBC   0.0             NT-proBNP 5,359               NUCLEATED RBC ABSOLUTE   0.00             Occult Blood UA         Moderate       pH, UA         5.0       Platelets   137             Potassium   5.2             PROTEIN TOTAL   4.6             Protein, UA         Negative       RBC   4.57             RBC, UA         10-15       RDW   16.7             Sodium   136             Specific [place], UA         1.025       Squam Epithel, UA         Few       TSH   2.200             UROBILINOGEN UA         2.0       WBC, UA         5-10       WBC   19.55                                    Significant Imaging: I have reviewed all pertinent imaging results/findings within the past 24 hours.

## 2023-01-27 NOTE — CONSULTS
[facility] - [address] [place] [address]  General Surgery  Consult Note    Inpatient consult to General Surgery  Consult performed by: Rob Ren MD  Consult ordered by: Orestes Wood DO  Reason for consult: Elevated bilirubin and liver  Assessment/Recommendations: Hopefully patient's cytology from the pleural fluid might show pathology if not he will need to go for a CT-guided liver biopsy at some point no need for actual surgical intervention likely palliative care was explained to the family      Subjective:     Chief Complaint/Reason for Admission:  Weakness and elevated LFTs possible sepsis    History of Present Illness:  77-year-old male admitted to the hospital with weakness and possible sepsis.  He has been seen in the past few weeks for liver lesions concerning for cholangiocarcinoma.  Recently he got admitted for pleural effusion which was tapped yesterday.  Still kind a week and workup revealed some liver lesions and metastatic disease    No current facility-administered medications on file prior to encounter.     Current Outpatient Medications on File Prior to Encounter   Medication Sig    aluminum & magnesium hydroxide-simethicone (MYLANTA MAX STRENGTH) 400-400-40 mg/5 mL suspension Take by mouth every 6 (six) hours as needed for Indigestion.    apixaban (ELIQUIS) 5 mg Tab Take 5 mg by mouth 2 (two) times daily.    baclofen (LIORESAL) 10 MG tablet Take 10 mg by mouth every 6 to 8 hours as needed.    bisacodyL (DULCOLAX) 5 mg EC tablet Take 5 mg by mouth daily as needed for Constipation.    diltiaZEM (CARDIZEM CD) 240 MG 24 hr capsule Take 1 capsule (240 mg total) by mouth once daily.    hydrALAZINE (APRESOLINE) 25 MG tablet Take 25 mg by mouth 2 (two) times daily.    ibuprofen (ADVIL,MOTRIN) 200 MG tablet Take 400 mg by mouth every 8 (eight) hours as needed for Pain.    isosorbide dinitrate (ISORDIL) 20 MG tablet Take 20 mg by mouth 3 (three) times daily.    senna (SENOKOT) 8.6 mg tablet  Take 1 tablet by mouth nightly.    tamsulosin (FLOMAX) 0.4 mg Cap Take by mouth once daily.       Review of patient's allergies indicates:  No Known Allergies    Past Medical History:   Diagnosis Date    Age-related nuclear cataract     Anticoagulant long-term use     Arthritis     BPH (benign prostatic hyperplasia)     Coronary artery disease     Essential (primary) hypertension     Hemiparesis affecting left side as late effect of stroke     Mixed hyperlipidemia     Paroxysmal atrial fibrillation     Presbyopia     Stroke      Past Surgical History:   Procedure Laterality Date    JOINT REPLACEMENT Left     hip     Family History    None       Tobacco Use    Smoking status: Never    Smokeless tobacco: Never   Substance and Sexual Activity    Alcohol use: Not Currently    Drug use: Never    Sexual activity: Not Currently     Review of Systems  Objective:     Vital Signs (Most Recent):  Temp: 97.9 °F (36.6 °C) (01/27/23 1200)  Pulse: 104 (01/27/23 1200)  Resp: 16 (01/27/23 1200)  BP: 138/72 (01/27/23 1200)  SpO2: 96 % (01/27/23 1200) Vital Signs (24h Range):  Temp:  [97.1 °F (36.2 °C)-98 °F (36.7 °C)] 97.9 °F (36.6 °C)  Pulse:  [] 104  Resp:  [12-25] 16  SpO2:  [92 %-98 %] 96 %  BP: (112-156)/(51-75) 138/72     Weight: 71.2 kg (157 lb)  Body mass index is 25.35 kg/m².      Intake/Output Summary (Last 24 hours) at 1/27/2023 1309  Last data filed at 1/26/2023 1505  Gross per 24 hour   Intake --   Output 200 ml   Net -200 ml       Physical Exam    Significant Labs:  BMP:   Recent Labs   Lab 01/26/23  0319 01/27/23  0402   GLU 66* 83    132*   K 5.2* 5.1    100   CO2 18* 17*   BUN 64* 71*   CREATININE 1.96* 2.60*   CALCIUM 7.5* 7.6*   MG 2.5*  --      Cardiac markers: No results for input(s): CKMB, CPKMB, TROPONINT, TROPONINI, MYOGLOBIN in the last 48 hours.  CBC:   Recent Labs   Lab 01/27/23  0402   WBC 22.52*   RBC 4.44*   HGB 13.3*   HCT 39.2*   *   MCV 88.3   MCH 30.0   MCHC 33.9     CMP:    Recent Labs   Lab 01/27/23  0402   GLU 83   CALCIUM 7.6*   ALBUMIN 1.7*   PROT 4.8*   *   K 5.1   CO2 17*      BUN 71*   CREATININE 2.60*   ALKPHOS 527*   *   AST 1,000*   BILITOT 9.8*       Significant Diagnostics:  I have reviewed all pertinent imaging results/findings within the past 24 hours.    Assessment/Plan:     Active Diagnoses:    Diagnosis Date Noted POA    PRINCIPAL PROBLEM:  Sepsis [A41.9] 01/25/2023 Yes    CHF (congestive heart failure) [I50.9] 01/26/2023 Yes    Atrial fibrillation with rapid ventricular response [I48.91] 01/26/2023 Yes    Pneumonia [J18.9] 01/26/2023 Yes    Paroxysmal atrial fibrillation [I48.0]  Yes    BPH (benign prostatic hyperplasia) [N40.0]  Yes    Polycystic liver disease [Q44.6]  Not Applicable      Problems Resolved During this Admission:       Thank you for your consult. I will sign off. Please contact us if you have any additional questions.    Rob Ren MD  General Surgery  [facility] - [address] [place] [address]

## 2023-01-27 NOTE — PLAN OF CARE
Problem: Adult Inpatient Plan of Care  Goal: Plan of Care Review  Outcome: Ongoing, Progressing  Goal: Patient-Specific Goal (Individualized)  Outcome: Ongoing, Progressing  Goal: Absence of Hospital-Acquired Illness or Injury  Outcome: Ongoing, Progressing  Goal: Optimal Comfort and Wellbeing  Outcome: Ongoing, Progressing  Goal: Readiness for Transition of Care  Outcome: Ongoing, Progressing     Problem: Skin Injury Risk Increased  Goal: Skin Health and Integrity  Outcome: Ongoing, Progressing     Problem: Adjustment to Illness (Sepsis/Septic Shock)  Goal: Optimal Coping  Outcome: Ongoing, Progressing     Problem: Bleeding (Sepsis/Septic Shock)  Goal: Absence of Bleeding  Outcome: Ongoing, Progressing     Problem: Glycemic Control Impaired (Sepsis/Septic Shock)  Goal: Blood Glucose Level Within Desired Range  Outcome: Ongoing, Progressing     Problem: Infection Progression (Sepsis/Septic Shock)  Goal: Absence of Infection Signs and Symptoms  Outcome: Ongoing, Progressing     Problem: Nutrition Impaired (Sepsis/Septic Shock)  Goal: Optimal Nutrition Intake  Outcome: Ongoing, Progressing     Problem: Fluid Imbalance (Pneumonia)  Goal: Fluid Balance  Outcome: Ongoing, Progressing     Problem: Infection (Pneumonia)  Goal: Resolution of Infection Signs and Symptoms  Outcome: Ongoing, Progressing     Problem: Respiratory Compromise (Pneumonia)  Goal: Effective Oxygenation and Ventilation  Outcome: Ongoing, Progressing     Problem: Fall Injury Risk  Goal: Absence of Fall and Fall-Related Injury  Outcome: Ongoing, Progressing     Problem: Gas Exchange Impaired  Goal: Optimal Gas Exchange  Outcome: Ongoing, Progressing

## 2023-01-27 NOTE — ASSESSMENT & PLAN NOTE
Patient with Long standing persistent (>12 months) atrial fibrillation which is uncontrolled currently with Calcium Channel Blocker. Patient is currently in atrial fibrillation.[id number] Score: 2. HASBLED Score: 4.     - Continue Cardizem  - EKG showed Afib with RVR  - Holding Eliquis 2/2 possible procedure. Would consider VTE prophylaxis in AM.

## 2023-01-27 NOTE — SUBJECTIVE & OBJECTIVE
Interval History: Patient is improving as far as his breathing is concerned after his recent thoracentesis yesterday. Patients is sitting comfortably. He still states he is having some abdominal pain. He is more jaundiced today than yesterday, his liver enzymes are trending upwards. Patients nuclear scan yesterday revealed obstructing pattern, but ultrasound reveals normal sized bile ducts. Differential includes potenital liver masses causing the elevations. Patient pleural fluid was exudative in nature with a high number of RBCs which is concerning for malignancy.     Review of Systems   Constitutional:  Negative for chills and fever.   HENT:  Positive for congestion. Negative for hearing loss.    Eyes:  Negative for visual disturbance.   Respiratory:  Positive for cough, chest tightness and shortness of breath.    Cardiovascular:  Positive for chest pain. Negative for palpitations and leg swelling.   Gastrointestinal:  Positive for nausea. Negative for abdominal pain, constipation, diarrhea and vomiting.   Genitourinary:  Negative for hematuria.   Musculoskeletal:  Negative for back pain.   Neurological:  Negative for dizziness, light-headedness and headaches.   Objective:     Vital Signs (Most Recent):  Temp: 97.3 °F (36.3 °C) (01/27/23 0700)  Pulse: 74 (01/27/23 0711)  Resp: 12 (01/27/23 0711)  BP: (!) 137/51 (reported to nurse Audelia) (01/27/23 0700)  SpO2: 96 % (01/27/23 0711)   Vital Signs (24h Range):  Temp:  [97.1 °F (36.2 °C)-98 °F (36.7 °C)] 97.3 °F (36.3 °C)  Pulse:  [] 74  Resp:  [12-25] 12  SpO2:  [92 %-98 %] 96 %  BP: (112-156)/(51-75) 137/51     Weight: 71.2 kg (157 lb)  Body mass index is 25.35 kg/m².    Intake/Output Summary (Last 24 hours) at 1/27/2023 1057  Last data filed at 1/26/2023 1505  Gross per 24 hour   Intake --   Output 200 ml   Net -200 ml      Physical Exam  Vitals and nursing note reviewed.   Constitutional:       General: He is not in acute distress.     Appearance: Normal  appearance. He is not ill-appearing.   HENT:      Head: Normocephalic and atraumatic.      Right Ear: External ear normal.      Left Ear: External ear normal.      Nose: Nose normal.      Mouth/Throat:      Mouth: Mucous membranes are dry.      Pharynx: Oropharynx is clear.   Eyes:      General: Scleral icterus present.      Extraocular Movements: Extraocular movements intact.      Conjunctiva/sclera: Conjunctivae normal.      Pupils: Pupils are equal, round, and reactive to light.   Cardiovascular:      Rate and Rhythm: Regular rhythm. Tachycardia present.      Heart sounds: Normal heart sounds. No murmur heard.  Pulmonary:      Effort: Pulmonary effort is normal. No respiratory distress.      Breath sounds: Decreased breath sounds and rales present. No wheezing.   Abdominal:      General: Bowel sounds are normal.      Palpations: Abdomen is soft.      Tenderness: There is generalized abdominal tenderness and tenderness in the epigastric area and suprapubic area. There is no guarding.      Comments: Subxiphoid tenderness present    Musculoskeletal:         General: No swelling.      Cervical back: Normal range of motion and neck supple.      Right lower leg: No edema.      Left lower leg: No edema.   Skin:     General: Skin is warm and dry.      Coloration: Skin is jaundiced (slight).      Findings: No rash or wound.      Comments: Surgical scar right hip   Neurological:      General: No focal deficit present.      Mental Status: He is alert and oriented to person, place, and time. Mental status is at baseline.      Sensory: Sensation is intact.      Motor: Weakness (left sided) present.   Psychiatric:         Mood and Affect: Mood normal.       Significant Labs: All pertinent labs within the past 24 hours have been reviewed.    Significant Imaging: I have reviewed all pertinent imaging results/findings within the past 24 hours.

## 2023-01-27 NOTE — ASSESSMENT & PLAN NOTE
Polycystic liver vs metastasis vs cholangicarcinoma  -HIDA scan  Heterogeneous uptake of radiotracer by the liver which correlates with recent MRI abdomen from December 29, 2022 which demonstrated multiple hepatic masses   No biliary excretion of radiotracer after 4 hours.  This appearance can be associated with diffuse hepatocellular dysfunction.  Complete common bile duct obstruction can also have this appearance, though that is felt to be unlikely given the fact that the common bile duct measured 4 mm diameter on the January 25, 2023 ultrasound.  -   Pt may benefit from liver biopsy

## 2023-01-27 NOTE — PROGRESS NOTES
"[facility] Hospital Medicine  Progress Note    Patient Name: Oleksandr Landeros  MRN: [id number]  Patient Class: IP- Inpatient   Admission Date: 1/25/2023  Length of Stay: 2 days  Attending Physician: Branden Jeffrey Jr., MD  Primary Care Provider: Elliott Fournier DO        Subjective:     Principal Problem:Sepsis        HPI:  Patient is a 76 y/o male transferred to [facility] from [place] with a cc of "Chest Pain." He states the pain was present for about an hour. At that time he was short of breath and his abdomen was hurting.  The pain is located in the upper and lower abdomen it does not radiate. Patient reports he tested positive for Covid 19, 9 days ago. Patient endorses constipation, decreased appetite, fatigue, cough, jaundice.    Patient had recent MRI of  Abdomen 12/19/2022 (per radiology report) Findings which can be seen in metastatic disease although cholangiocarcinoma is also differential concern.  Ultrasound-guided or CT-guided soft tissue sampling is recommended.There is enhancing irregular soft tissue surrounding the gallbladder.     Out[facility] findings include:  Vitals: /68, , RR 18, Temp 97.3 F, O2 96%  Labs :WBC 22.22, HGB 14.6, HCT 44.8, MCV 91.2            Na 140, K 5.1, BUN 61, CR 2.04, Glucose 95, eGFR 33          Alk Phos 469, , ALT 99 Total Bilirubin 8.5, Pro BNP 8730, Baseline 4000          UA WBCs 5-10, RBCs 10-15          BC x 2 pending  Imaging: US Abdomen  (per radiology report) Evidence of widespread hepatic metastatic disease. Suspect cholelithiasis. No abnormal biliary dilatation. Right pleural effusion. Trace ascites.  Interventions:  Approximately 2 L NS, Zosyn, Dilaudid     Patient was subsequently admitted to the hospital medicine service under the direct supervision of Dr Bernard for further evaluation and management.      Overview/Hospital Course:  No notes on file    Interval History: " Patient is improving as far as his breathing is concerned after his recent thoracentesis yesterday. Patients is sitting comfortably. He still states he is having some abdominal pain. He is more jaundiced today than yesterday, his liver enzymes are trending upwards. Patients nuclear scan yesterday revealed obstructing pattern, but ultrasound reveals normal sized bile ducts. Differential includes potenital liver masses causing the elevations. Patient pleural fluid was exudative in nature with a high number of RBCs which is concerning for malignancy.     Review of Systems   Constitutional:  Negative for chills and fever.   HENT:  Positive for congestion. Negative for hearing loss.    Eyes:  Negative for visual disturbance.   Respiratory:  Positive for cough, chest tightness and shortness of breath.    Cardiovascular:  Positive for chest pain. Negative for palpitations and leg swelling.   Gastrointestinal:  Positive for nausea. Negative for abdominal pain, constipation, diarrhea and vomiting.   Genitourinary:  Negative for hematuria.   Musculoskeletal:  Negative for back pain.   Neurological:  Negative for dizziness, light-headedness and headaches.   Objective:     Vital Signs (Most Recent):  Temp: 97.3 °F (36.3 °C) (01/27/23 0700)  Pulse: 74 (01/27/23 0711)  Resp: 12 (01/27/23 0711)  BP: (!) 137/51 (reported to nurse Audelia) (01/27/23 0700)  SpO2: 96 % (01/27/23 0711)   Vital Signs (24h Range):  Temp:  [97.1 °F (36.2 °C)-98 °F (36.7 °C)] 97.3 °F (36.3 °C)  Pulse:  [] 74  Resp:  [12-25] 12  SpO2:  [92 %-98 %] 96 %  BP: (112-156)/(51-75) 137/51     Weight: 71.2 kg (157 lb)  Body mass index is 25.35 kg/m².    Intake/Output Summary (Last 24 hours) at 1/27/2023 1057  Last data filed at 1/26/2023 1505  Gross per 24 hour   Intake --   Output 200 ml   Net -200 ml      Physical Exam  Vitals and nursing note reviewed.   Constitutional:       General: He is not in acute distress.     Appearance: Normal appearance. He is not  ill-appearing.   HENT:      Head: Normocephalic and atraumatic.      Right Ear: External ear normal.      Left Ear: External ear normal.      Nose: Nose normal.      Mouth/Throat:      Mouth: Mucous membranes are dry.      Pharynx: Oropharynx is clear.   Eyes:      General: Scleral icterus present.      Extraocular Movements: Extraocular movements intact.      Conjunctiva/sclera: Conjunctivae normal.      Pupils: Pupils are equal, round, and reactive to light.   Cardiovascular:      Rate and Rhythm: Regular rhythm. Tachycardia present.      Heart sounds: Normal heart sounds. No murmur heard.  Pulmonary:      Effort: Pulmonary effort is normal. No respiratory distress.      Breath sounds: Decreased breath sounds and rales present. No wheezing.   Abdominal:      General: Bowel sounds are normal.      Palpations: Abdomen is soft.      Tenderness: There is generalized abdominal tenderness and tenderness in the epigastric area and suprapubic area. There is no guarding.      Comments: Subxiphoid tenderness present    Musculoskeletal:         General: No swelling.      Cervical back: Normal range of motion and neck supple.      Right lower leg: No edema.      Left lower leg: No edema.   Skin:     General: Skin is warm and dry.      Coloration: Skin is jaundiced (slight).      Findings: No rash or wound.      Comments: Surgical scar right hip   Neurological:      General: No focal deficit present.      Mental Status: He is alert and oriented to person, place, and time. Mental status is at baseline.      Sensory: Sensation is intact.      Motor: Weakness (left sided) present.   Psychiatric:         Mood and Affect: Mood normal.       Significant Labs: All pertinent labs within the past 24 hours have been reviewed.    Significant Imaging: I have reviewed all pertinent imaging results/findings within the past 24 hours.      Assessment/Plan:      * Sepsis  Antibiotics given-   Antibiotics (72h ago, onward)    Start     Stop  Route Frequency Ordered    01/26/23 1300  piperacillin-tazobactam (ZOSYN) 4.5 g in dextrose 5 % in water (D5W) 5 % 100 mL IVPB (MB+)  (piperacillin/tazobactam IVPB in adult patients)         -- IV Every 8 hours (non-standard times) 01/26/23 1148    01/26/23 1300  azithromycin (ZITHROMAX) 500 mg in dextrose 5 % (D5W) 250 mL IVPB         -- IV Every 24 hours (non-standard times) 01/26/23 1148        Latest lactate reviewed-  Recent Labs   Lab 01/25/23  1400 01/25/23  1638 01/26/23  0657   LACTATE 2.5* 2.2* 1.8     Organ dysfunction indicated by Acute kidney injury, Acute liver injury and Acute heart failure    Fluid challenge Other- Patient to receive 2 L volume other than 30cc/kg due to Volume overload due to- Presumed CHF     Post- resuscitation assessment Yes Perfusion exam was performed within 6 hours of septic shock presentation after bolus shows Adequate tissue perfusion assessed by non-invasive monitoring       Will Not start Pressors- Levophed for MAP of 65  Source control achieved by: Antibiotics    - Suspected source includes Pneumonia vs UTI vs Cholecystitis   - qSOFA score: 0 points  - Initial 2 SIRS criteria met:  and WBC 22.22  - LR 1 L IV at 250 mL/hr x1  - BC x2 pending (at outlying facility)  - D/C Empiric Rocephin and Metronidazole due to tx of possible PNA  - CXR demonstrates predominantly right sided basilar atelectasis/infiltrate and mild bilateral pleural effusion  -IR consult to thoracentesis pleural effusion for therapeutic and diagnositic benefit.  - Pleural fluid exudative in nature with lights criteria.   - Will consult surgery regarding evaluation of drain for due to increasing liver enzymes, direct bilirubin 8, total bilirubin 9.8   -KUB suggestive of mild ileus due to gaseous distension of small bowel.  -HIDA scan   Heterogeneous uptake of radiotracer by the liver which correlates with recent MRI abdomen from December 29, 2022 which demonstrated multiple hepatic masses   No biliary  excretion of radiotracer after 4 hours.  This appearance can be associated with diffuse hepatocellular dysfunction.  Complete common bile duct obstruction can also have this appearance, though that is felt to be unlikely given the fact that the common bile duct measured 4 mm diameter on the January 25, 2023 ultrasound.  - Will start on zosyn, azithromycin for PNA Abx      Pneumonia  CXR reveals potential pneumonia on right side. Patient has been in nursing facility for 5 years now, will treat as HAP due to these concerns with Zosyn and Azithryomycin.         Atrial fibrillation with rapid ventricular response  Patient with Paroxysmal (<7 days) atrial fibrillation which is controlled currently with Calcium Channel Blocker. Patient is currently in sinus rhythm.[id number] Score: 2.  Anticoagulation not indicated due to procedure.        CHF (congestive heart failure)  - Cannot rule out new onset heart failure  - BNP 5359   - Echo pending  -Troponin WNL 31.8  - Monitor fluid status    Polycystic liver disease  Polycystic liver vs metastasis vs cholangicarcinoma  -HIDA scan  Heterogeneous uptake of radiotracer by the liver which correlates with recent MRI abdomen from December 29, 2022 which demonstrated multiple hepatic masses   No biliary excretion of radiotracer after 4 hours.  This appearance can be associated with diffuse hepatocellular dysfunction.  Complete common bile duct obstruction can also have this appearance, though that is felt to be unlikely given the fact that the common bile duct measured 4 mm diameter on the January 25, 2023 ultrasound.  -   Pt may benefit from liver biopsy       BPH (benign prostatic hyperplasia)  - Continue Flomax      Paroxysmal atrial fibrillation  Patient with Long standing persistent (>12 months) atrial fibrillation which is uncontrolled currently with Calcium Channel Blocker. Patient is currently in atrial fibrillation.[id number] Score: 2. HASBLED Score: 4.     - Continue  Cardizem  - EKG showed Afib with RVR  - Holding Eliquis 2/2 possible procedure. Would consider VTE prophylaxis in AM.      VTE Risk Mitigation (From admission, onward)    None          Discharge Planning   ELENA:      Code Status: Full Code   Is the patient medically ready for discharge?:     Reason for patient still in hospital (select all that apply): Treatment  Discharge Plan A: Return to nursing home                  Orestes Wood DO  Department of Hospital Medicine   [facility]   <<-----Click here for Discharge Medication Review

## 2023-01-27 NOTE — ASSESSMENT & PLAN NOTE
Antibiotics given-   Antibiotics (72h ago, onward)    Start     Stop Route Frequency Ordered    01/26/23 1300  piperacillin-tazobactam (ZOSYN) 4.5 g in dextrose 5 % in water (D5W) 5 % 100 mL IVPB (MB+)  (piperacillin/tazobactam IVPB in adult patients)         -- IV Every 8 hours (non-standard times) 01/26/23 1148    01/26/23 1300  azithromycin (ZITHROMAX) 500 mg in dextrose 5 % (D5W) 250 mL IVPB         -- IV Every 24 hours (non-standard times) 01/26/23 1148        Latest lactate reviewed-  Recent Labs   Lab 01/25/23  1400 01/25/23  1638 01/26/23  0657   LACTATE 2.5* 2.2* 1.8     Organ dysfunction indicated by Acute kidney injury, Acute liver injury and Acute heart failure    Fluid challenge Other- Patient to receive 2 L volume other than 30cc/kg due to Volume overload due to- Presumed CHF     Post- resuscitation assessment Yes Perfusion exam was performed within 6 hours of septic shock presentation after bolus shows Adequate tissue perfusion assessed by non-invasive monitoring       Will Not start Pressors- Levophed for MAP of 65  Source control achieved by: Antibiotics    - Suspected source includes Pneumonia vs UTI vs Cholecystitis   - qSOFA score: 0 points  - Initial 2 SIRS criteria met:  and WBC 22.22  - LR 1 L IV at 250 mL/hr x1  - BC x2 pending (at outlying facility)  - D/C Empiric Rocephin and Metronidazole due to tx of possible PNA  - CXR demonstrates predominantly right sided basilar atelectasis/infiltrate and mild bilateral pleural effusion  -IR consult to thoracentesis pleural effusion for therapeutic and diagnositic benefit.  - Pleural fluid exudative in nature with lights criteria.   - Will consult surgery regarding evaluation of drain for due to increasing liver enzymes, direct bilirubin 8, total bilirubin 9.8   -KUB suggestive of mild ileus due to gaseous distension of small bowel.  -HIDA scan   Heterogeneous uptake of radiotracer by the liver which correlates with recent MRI abdomen from  December 29, 2022 which demonstrated multiple hepatic masses   No biliary excretion of radiotracer after 4 hours.  This appearance can be associated with diffuse hepatocellular dysfunction.  Complete common bile duct obstruction can also have this appearance, though that is felt to be unlikely given the fact that the common bile duct measured 4 mm diameter on the January 25, 2023 ultrasound.  - Will start on zosyn, azithromycin for PNA Abx

## 2023-01-27 NOTE — ASSESSMENT & PLAN NOTE
Patient with Paroxysmal (<7 days) atrial fibrillation which is controlled currently with Calcium Channel Blocker. Patient is currently in sinus rhythm.[id number] Score: 2.  Anticoagulation not indicated due to procedure.

## 2023-01-28 PROBLEM — C78.7 METASTATIC CANCER TO LIVER: Status: ACTIVE | Noted: 2023-01-01

## 2023-01-28 NOTE — PROGRESS NOTES
"[facility] Tele[facility] Hospital Medicine  Progress Note    Patient Name: Oleksandr Landeros  MRN: [id number]  Patient Class: IP- Inpatient   Admission Date: 1/25/2023  Length of Stay: 3 days  Attending Physician: Branden Jeffrey Jr., MD  Primary Care Provider: Elliott Fournier DO        Subjective:     Principal Problem:Sepsis        HPI:  Patient is a 78 y/o male transferred to [facility] from [place] with a cc of "Chest Pain." He states the pain was present for about an hour. At that time he was short of breath and his abdomen was hurting.  The pain is located in the upper and lower abdomen it does not radiate. Patient reports he tested positive for Covid 19, 9 days ago. Patient endorses constipation, decreased appetite, fatigue, cough, jaundice.    Patient had recent MRI of  Abdomen 12/19/2022 (per radiology report) Findings which can be seen in metastatic disease although cholangiocarcinoma is also differential concern.  Ultrasound-guided or CT-guided soft tissue sampling is recommended.There is enhancing irregular soft tissue surrounding the gallbladder.     Outlying [facility] findings include:  Vitals: /68, , RR 18, Temp 97.3 F, O2 96%  Labs :WBC 22.22, HGB 14.6, HCT 44.8, MCV 91.2            Na 140, K 5.1, BUN 61, CR 2.04, Glucose 95, eGFR 33          Alk Phos 469, , ALT 99 Total Bilirubin 8.5, Pro BNP 8730, Baseline 4000          UA WBCs 5-10, RBCs 10-15          BC x 2 pending  Imaging: US Abdomen  (per radiology report) Evidence of widespread hepatic metastatic disease. Suspect cholelithiasis. No abnormal biliary dilatation. Right pleural effusion. Trace ascites.  Interventions:  Approximately 2 L NS, Zosyn, Dilaudid     Patient was subsequently admitted to the hospital medicine service under the direct supervision of Dr Bernard for further evaluation and management.      Overview/Hospital Course:  No notes on file    Interval History: " Patient is sitting comfortably in bed. Family reports he slept well last night and did not have any issues overnight. Patient states his breathing is improving and says his breathing treatments are helping him. Patients lungs sound better than yesterday and are improving.     Review of Systems   Constitutional:  Negative for chills and fever.   HENT:  Positive for congestion. Negative for hearing loss.    Eyes:  Negative for visual disturbance.   Respiratory:  Positive for cough, chest tightness and shortness of breath.    Cardiovascular:  Positive for chest pain. Negative for palpitations and leg swelling.   Gastrointestinal:  Positive for abdominal pain and nausea. Negative for constipation, diarrhea and vomiting.   Genitourinary:  Negative for hematuria.   Musculoskeletal:  Negative for back pain.   Neurological:  Negative for dizziness, light-headedness and headaches.   Objective:     Vital Signs (Most Recent):  Temp: 97.8 °F (36.6 °C) (01/28/23 0710)  Pulse: (!) 111 (01/28/23 0744)  Resp: 20 (01/28/23 0744)  BP: (!) 145/58 (01/28/23 0710)  SpO2: 95 % (01/28/23 0744)   Vital Signs (24h Range):  Temp:  [97.2 °F (36.2 °C)-98.3 °F (36.8 °C)] 97.8 °F (36.6 °C)  Pulse:  [] 111  Resp:  [16-22] 20  SpO2:  [94 %-97 %] 95 %  BP: (124-145)/(54-77) 145/58     Weight: 71.2 kg (157 lb)  Body mass index is 25.35 kg/m².  No intake or output data in the 24 hours ending 01/28/23 0924   Physical Exam  Vitals and nursing note reviewed.   Constitutional:       General: He is not in acute distress.     Appearance: Normal appearance. He is not ill-appearing.   HENT:      Head: Normocephalic and atraumatic.      Right Ear: External ear normal.      Left Ear: External ear normal.      Nose: Nose normal.      Mouth/Throat:      Mouth: Mucous membranes are dry.      Pharynx: Oropharynx is clear.   Eyes:      General: Scleral icterus present.      Extraocular Movements: Extraocular movements intact.      Conjunctiva/sclera:  Conjunctivae normal.      Pupils: Pupils are equal, round, and reactive to light.   Cardiovascular:      Rate and Rhythm: Regular rhythm. Tachycardia present.      Heart sounds: Normal heart sounds. No murmur heard.  Pulmonary:      Effort: Pulmonary effort is normal. No respiratory distress.      Breath sounds: Decreased breath sounds and rales present. No wheezing.   Abdominal:      General: Bowel sounds are normal.      Palpations: Abdomen is soft.      Tenderness: There is generalized abdominal tenderness and tenderness in the epigastric area and suprapubic area. There is no guarding.      Comments: Subxiphoid tenderness present    Musculoskeletal:         General: No swelling.      Cervical back: Normal range of motion and neck supple.      Right lower leg: No edema.      Left lower leg: No edema.   Skin:     General: Skin is warm and dry.      Coloration: Skin is jaundiced (slight).      Findings: No rash or wound.      Comments: Surgical scar right hip   Neurological:      Mental Status: He is alert and oriented to person, place, and time. Mental status is at baseline.      Sensory: Sensation is intact.      Motor: Weakness (left sided) present.   Psychiatric:         Mood and Affect: Mood normal.       Significant Labs: All pertinent labs within the past 24 hours have been reviewed.    Significant Imaging: I have reviewed all pertinent imaging results/findings within the past 24 hours.      Assessment/Plan:      * Sepsis  Antibiotics given-   Antibiotics (72h ago, onward)    Start     Stop Route Frequency Ordered    01/26/23 1300  piperacillin-tazobactam (ZOSYN) 4.5 g in dextrose 5 % in water (D5W) 5 % 100 mL IVPB (MB+)  (piperacillin/tazobactam IVPB in adult patients)         -- IV Every 8 hours (non-standard times) 01/26/23 1148    01/26/23 1300  azithromycin (ZITHROMAX) 500 mg in dextrose 5 % (D5W) 250 mL IVPB         -- IV Every 24 hours (non-standard times) 01/26/23 1148        Latest lactate  reviewed-  Recent Labs   Lab 01/25/23  1400 01/25/23  1638 01/26/23  0657   LACTATE 2.5* 2.2* 1.8     Organ dysfunction indicated by Acute kidney injury, Acute liver injury and Acute heart failure    Fluid challenge Other- Patient to receive 2 L volume other than 30cc/kg due to Volume overload due to- Presumed CHF     Post- resuscitation assessment Yes Perfusion exam was performed within 6 hours of septic shock presentation after bolus shows Adequate tissue perfusion assessed by non-invasive monitoring       Will Not start Pressors- Levophed for MAP of 65  Source control achieved by: Antibiotics    - Suspected source includes Pneumonia vs UTI vs Cholecystitis   - qSOFA score: 0 points  - Initial 2 SIRS criteria met:  and WBC 22.22  - LR 1 L IV at 250 mL/hr x1  - BC x2 NGTD  - CXR demonstrates predominantly right sided basilar atelectasis/infiltrate and mild bilateral pleural effusion  -IR consult to thoracentesis pleural effusion for therapeutic and diagnositic benefit.  - Pleural fluid exudative in nature with lights criteria.   - surgery consulted regarding elevated liver enzymes, they do not believe there is any surgical needs they could provide at this time, as we await cytology results from the pleural effusion, if cytology results inconclusive will likely need to get a liver biopsy.   -KUB suggestive of mild ileus due to gaseous distension of small bowel.  -HIDA scan   Heterogeneous uptake of radiotracer by the liver which correlates with recent MRI abdomen from December 29, 2022 which demonstrated multiple hepatic masses   No biliary excretion of radiotracer after 4 hours.  This appearance can be associated with diffuse hepatocellular dysfunction.  Complete common bile duct obstruction can also have this appearance, though that is felt to be unlikely given the fact that the common bile duct measured 4 mm diameter on the January 25, 2023 ultrasound.  - Will start on zosyn, azithromycin for PNA Abx  -  worsening hepatic enzymes, and renal function.       Pneumonia  CXR reveals potential pneumonia on right side. Patient has been in nursing facility for 5 years now, will treat as HAP due to these concerns with Zosyn and Azithryomycin.         Atrial fibrillation with rapid ventricular response  Patient with Paroxysmal (<7 days) atrial fibrillation which is controlled currently with Calcium Channel Blocker. Patient is currently in sinus rhythm.[id number] Score: 2.  Anticoagulation not indicated due to procedure.        CHF (congestive heart failure)  - Cannot rule out new onset heart failure  - BNP 5359   - Echo    The left ventricle is normal in size with normal LV systolic function.   The estimated ejection fraction is 55%.   Atrial fibrillation observed.   Normal right ventricular size with normal right ventricular systolic function.   Mild tricuspid regurgitation.   Intermediate central venous pressure (8 mmHg).   The estimated PA systolic pressure is 31 mmHg.   Trivial pericardial effusion.  -Troponin WNL 31.8  - Monitor fluid status    Polycystic liver disease  Polycystic liver vs metastasis vs cholangicarcinoma  -HIDA scan  Heterogeneous uptake of radiotracer by the liver which correlates with recent MRI abdomen from December 29, 2022 which demonstrated multiple hepatic masses   No biliary excretion of radiotracer after 4 hours.  This appearance can be associated with diffuse hepatocellular dysfunction.  Complete common bile duct obstruction can also have this appearance, though that is felt to be unlikely given the fact that the common bile duct measured 4 mm diameter on the January 25, 2023 ultrasound.  -   Pt may benefit from liver biopsy       BPH (benign prostatic hyperplasia)  - Continue Flomax      Paroxysmal atrial fibrillation  Patient with Long standing persistent (>12 months) atrial fibrillation which is uncontrolled currently with Calcium Channel Blocker. Patient is currently in atrial  fibrillation.[id number] Score: 2. HASBLED Score: 4.   -patient has episodes of tachycardia with HR in 140s and comes back down without pharmacologic intervention.   - Continue Cardizem  - EKG showed Afib with RVR  - Holding Eliquis 2/2 possible procedure. Would consider VTE prophylaxis in AM.      VTE Risk Mitigation (From admission, onward)    None          Discharge Planning   ELENA:      Code Status: Full Code   Is the patient medically ready for discharge?:     Reason for patient still in hospital (select all that apply): Treatment  Discharge Plan A: Return to nursing home                  Orestes Wood DO  Department of Hospital Medicine   [facility] - [address] [place] [address]

## 2023-01-28 NOTE — ASSESSMENT & PLAN NOTE
- Cannot rule out new onset heart failure  - BNP 5359   - Echo    The left ventricle is normal in size with normal LV systolic function.   The estimated ejection fraction is 55%.   Atrial fibrillation observed.   Normal right ventricular size with normal right ventricular systolic function.   Mild tricuspid regurgitation.   Intermediate central venous pressure (8 mmHg).   The estimated PA systolic pressure is 31 mmHg.   Trivial pericardial effusion.  -Troponin WNL 31.8  - Monitor fluid status

## 2023-01-28 NOTE — ASSESSMENT & PLAN NOTE
Patient with Paroxysmal (<7 days) atrial fibrillation which is controlled currently with Calcium Channel Blocker. Patient is currently in sinus rhythm.[id number] Score: 2.  Anticoagulation not indicated due to procedure.

## 2023-01-28 NOTE — PLAN OF CARE
Problem: Adult Inpatient Plan of Care  Goal: Plan of Care Review  Outcome: Ongoing, Progressing  Goal: Patient-Specific Goal (Individualized)  Outcome: Ongoing, Progressing  Goal: Absence of Hospital-Acquired Illness or Injury  Outcome: Ongoing, Progressing  Goal: Optimal Comfort and Wellbeing  Outcome: Ongoing, Progressing  Goal: Readiness for Transition of Care  Outcome: Ongoing, Progressing     Problem: Skin Injury Risk Increased  Goal: Skin Health and Integrity  Outcome: Ongoing, Progressing     Problem: Adjustment to Illness (Sepsis/Septic Shock)  Goal: Optimal Coping  Outcome: Ongoing, Progressing     Problem: Bleeding (Sepsis/Septic Shock)  Goal: Absence of Bleeding  Outcome: Ongoing, Progressing     Problem: Glycemic Control Impaired (Sepsis/Septic Shock)  Goal: Blood Glucose Level Within Desired Range  Outcome: Ongoing, Progressing     Problem: Infection Progression (Sepsis/Septic Shock)  Goal: Absence of Infection Signs and Symptoms  Outcome: Ongoing, Progressing     Problem: Nutrition Impaired (Sepsis/Septic Shock)  Goal: Optimal Nutrition Intake  Outcome: Ongoing, Progressing     Problem: Fluid Imbalance (Pneumonia)  Goal: Fluid Balance  Outcome: Ongoing, Progressing     Problem: Infection (Pneumonia)  Goal: Resolution of Infection Signs and Symptoms  Outcome: Ongoing, Progressing     Problem: Respiratory Compromise (Pneumonia)  Goal: Effective Oxygenation and Ventilation  Outcome: Ongoing, Progressing     Problem: Fall Injury Risk  Goal: Absence of Fall and Fall-Related Injury  Outcome: Ongoing, Progressing     Problem: Gas Exchange Impaired  Goal: Optimal Gas Exchange  Outcome: Ongoing, Progressing     Problem: Airway Clearance Ineffective  Goal: Effective Airway Clearance  Outcome: Ongoing, Progressing

## 2023-01-28 NOTE — NURSING
Patient complaining of chest pain running down the middle of his chest . EKG obtained and Dr. Jeffrey noticed.

## 2023-01-28 NOTE — ASSESSMENT & PLAN NOTE
Patient with Long standing persistent (>12 months) atrial fibrillation which is uncontrolled currently with Calcium Channel Blocker. Patient is currently in atrial fibrillation.[id number] Score: 2. HASBLED Score: 4.   -patient has episodes of tachycardia with HR in 140s and comes back down without pharmacologic intervention.   - Continue Cardizem  - EKG showed Afib with RVR  - Holding Eliquis 2/2 possible procedure. Would consider VTE prophylaxis in AM.

## 2023-01-28 NOTE — SUBJECTIVE & OBJECTIVE
Interval History: Patient is sitting comfortably in bed. Family reports he slept well last night and did not have any issues overnight. Patient states his breathing is improving and says his breathing treatments are helping him. Patients lungs sound better than yesterday and are improving.     Review of Systems   Constitutional:  Negative for chills and fever.   HENT:  Positive for congestion. Negative for hearing loss.    Eyes:  Negative for visual disturbance.   Respiratory:  Positive for cough, chest tightness and shortness of breath.    Cardiovascular:  Positive for chest pain. Negative for palpitations and leg swelling.   Gastrointestinal:  Positive for abdominal pain and nausea. Negative for constipation, diarrhea and vomiting.   Genitourinary:  Negative for hematuria.   Musculoskeletal:  Negative for back pain.   Neurological:  Negative for dizziness, light-headedness and headaches.   Objective:     Vital Signs (Most Recent):  Temp: 97.8 °F (36.6 °C) (01/28/23 0710)  Pulse: (!) 111 (01/28/23 0744)  Resp: 20 (01/28/23 0744)  BP: (!) 145/58 (01/28/23 0710)  SpO2: 95 % (01/28/23 0744)   Vital Signs (24h Range):  Temp:  [97.2 °F (36.2 °C)-98.3 °F (36.8 °C)] 97.8 °F (36.6 °C)  Pulse:  [] 111  Resp:  [16-22] 20  SpO2:  [94 %-97 %] 95 %  BP: (124-145)/(54-77) 145/58     Weight: 71.2 kg (157 lb)  Body mass index is 25.35 kg/m².  No intake or output data in the 24 hours ending 01/28/23 0924   Physical Exam  Vitals and nursing note reviewed.   Constitutional:       General: He is not in acute distress.     Appearance: Normal appearance. He is not ill-appearing.   HENT:      Head: Normocephalic and atraumatic.      Right Ear: External ear normal.      Left Ear: External ear normal.      Nose: Nose normal.      Mouth/Throat:      Mouth: Mucous membranes are dry.      Pharynx: Oropharynx is clear.   Eyes:      General: Scleral icterus present.      Extraocular Movements: Extraocular movements intact.       Conjunctiva/sclera: Conjunctivae normal.      Pupils: Pupils are equal, round, and reactive to light.   Cardiovascular:      Rate and Rhythm: Regular rhythm. Tachycardia present.      Heart sounds: Normal heart sounds. No murmur heard.  Pulmonary:      Effort: Pulmonary effort is normal. No respiratory distress.      Breath sounds: Decreased breath sounds and rales present. No wheezing.   Abdominal:      General: Bowel sounds are normal.      Palpations: Abdomen is soft.      Tenderness: There is generalized abdominal tenderness and tenderness in the epigastric area and suprapubic area. There is no guarding.      Comments: Subxiphoid tenderness present    Musculoskeletal:         General: No swelling.      Cervical back: Normal range of motion and neck supple.      Right lower leg: No edema.      Left lower leg: No edema.   Skin:     General: Skin is warm and dry.      Coloration: Skin is jaundiced (slight).      Findings: No rash or wound.      Comments: Surgical scar right hip   Neurological:      Mental Status: He is alert and oriented to person, place, and time. Mental status is at baseline.      Sensory: Sensation is intact.      Motor: Weakness (left sided) present.   Psychiatric:         Mood and Affect: Mood normal.       Significant Labs: All pertinent labs within the past 24 hours have been reviewed.    Significant Imaging: I have reviewed all pertinent imaging results/findings within the past 24 hours.

## 2023-01-28 NOTE — PLAN OF CARE
Problem: Adult Inpatient Plan of Care  Goal: Plan of Care Review  Outcome: Ongoing, Progressing  Goal: Patient-Specific Goal (Individualized)  Outcome: Ongoing, Progressing  Goal: Absence of Hospital-Acquired Illness or Injury  Outcome: Ongoing, Progressing  Goal: Optimal Comfort and Wellbeing  Outcome: Ongoing, Progressing  Goal: Readiness for Transition of Care  Outcome: Ongoing, Progressing     Problem: Skin Injury Risk Increased  Goal: Skin Health and Integrity  Outcome: Ongoing, Progressing     Problem: Adjustment to Illness (Sepsis/Septic Shock)  Goal: Optimal Coping  Outcome: Ongoing, Progressing     Problem: Bleeding (Sepsis/Septic Shock)  Goal: Absence of Bleeding  Outcome: Ongoing, Progressing     Problem: Glycemic Control Impaired (Sepsis/Septic Shock)  Goal: Blood Glucose Level Within Desired Range  Outcome: Ongoing, Progressing     Problem: Infection Progression (Sepsis/Septic Shock)  Goal: Absence of Infection Signs and Symptoms  Outcome: Ongoing, Progressing     Problem: Nutrition Impaired (Sepsis/Septic Shock)  Goal: Optimal Nutrition Intake  Outcome: Ongoing, Progressing     Problem: Fluid Imbalance (Pneumonia)  Goal: Fluid Balance  Outcome: Ongoing, Progressing     Problem: Infection (Pneumonia)  Goal: Resolution of Infection Signs and Symptoms  Outcome: Ongoing, Progressing     Problem: Respiratory Compromise (Pneumonia)  Goal: Effective Oxygenation and Ventilation  Outcome: Ongoing, Progressing     Problem: Fall Injury Risk  Goal: Absence of Fall and Fall-Related Injury  Outcome: Ongoing, Progressing     Problem: Gas Exchange Impaired  Goal: Optimal Gas Exchange  Outcome: Ongoing, Progressing     Problem: Airway Clearance Ineffective  Goal: Effective Airway Clearance  Outcome: Ongoing, Progressing      POC reviewed and continues

## 2023-01-28 NOTE — ASSESSMENT & PLAN NOTE
Antibiotics given-   Antibiotics (72h ago, onward)    Start     Stop Route Frequency Ordered    01/26/23 1300  piperacillin-tazobactam (ZOSYN) 4.5 g in dextrose 5 % in water (D5W) 5 % 100 mL IVPB (MB+)  (piperacillin/tazobactam IVPB in adult patients)         -- IV Every 8 hours (non-standard times) 01/26/23 1148    01/26/23 1300  azithromycin (ZITHROMAX) 500 mg in dextrose 5 % (D5W) 250 mL IVPB         -- IV Every 24 hours (non-standard times) 01/26/23 1148        Latest lactate reviewed-  Recent Labs   Lab 01/25/23  1400 01/25/23  1638 01/26/23  0657   LACTATE 2.5* 2.2* 1.8     Organ dysfunction indicated by Acute kidney injury, Acute liver injury and Acute heart failure    Fluid challenge Other- Patient to receive 2 L volume other than 30cc/kg due to Volume overload due to- Presumed CHF     Post- resuscitation assessment Yes Perfusion exam was performed within 6 hours of septic shock presentation after bolus shows Adequate tissue perfusion assessed by non-invasive monitoring       Will Not start Pressors- Levophed for MAP of 65  Source control achieved by: Antibiotics    - Suspected source includes Pneumonia vs UTI vs Cholecystitis   - qSOFA score: 0 points  - Initial 2 SIRS criteria met:  and WBC 22.22  - LR 1 L IV at 250 mL/hr x1  - BC x2 NGTD  - CXR demonstrates predominantly right sided basilar atelectasis/infiltrate and mild bilateral pleural effusion  -IR consult to thoracentesis pleural effusion for therapeutic and diagnositic benefit.  - Pleural fluid exudative in nature with lights criteria.   - surgery consulted regarding elevated liver enzymes, they do not believe there is any surgical needs they could provide at this time, as we await cytology results from the pleural effusion, if cytology results inconclusive will likely need to get a liver biopsy.   -KUB suggestive of mild ileus due to gaseous distension of small bowel.  -HIDA scan   Heterogeneous uptake of radiotracer by the liver which  correlates with recent MRI abdomen from December 29, 2022 which demonstrated multiple hepatic masses   No biliary excretion of radiotracer after 4 hours.  This appearance can be associated with diffuse hepatocellular dysfunction.  Complete common bile duct obstruction can also have this appearance, though that is felt to be unlikely given the fact that the common bile duct measured 4 mm diameter on the January 25, 2023 ultrasound.  - Will start on zosyn, azithromycin for PNA Abx  - worsening hepatic enzymes, and renal function.

## 2023-01-28 NOTE — PROGRESS NOTES
[facility] - [address] [place] [address]  General Surgery  Progress Note    Subjective:     Interval History:  Patient feeling better breathing better eating minimal bites.    Post-Op Info:  * No surgery found *          Medications:  Continuous Infusions:  Scheduled Meds:   albuterol-ipratropium  3 mL Nebulization Q6H    azithromycin  500 mg Intravenous Q24H    diltiaZEM  240 mg Oral Daily    piperacillin-tazobactam (ZOSYN) IVPB  4.5 g Intravenous Q8H    tamsulosin  0.4 mg Oral Daily     PRN Meds:albuterol sulfate, bisacodyL, dextromethorphan-guaiFENesin  mg/5 ml, ondansetron, simethicone, traZODone     Objective:     Vital Signs (Most Recent):  Temp: 97.8 °F (36.6 °C) (01/28/23 0710)  Pulse: (!) 111 (01/28/23 0744)  Resp: 20 (01/28/23 0744)  BP: (!) 145/58 (01/28/23 0710)  SpO2: 95 % (01/28/23 0744)   Vital Signs (24h Range):  Temp:  [97.2 °F (36.2 °C)-98.3 °F (36.8 °C)] 97.8 °F (36.6 °C)  Pulse:  [] 111  Resp:  [16-22] 20  SpO2:  [94 %-97 %] 95 %  BP: (124-145)/(54-77) 145/58     No intake or output data in the 24 hours ending 01/28/23 1004    Physical Exam  Constitutional:       General: He is not in acute distress.     Appearance: He is ill-appearing.   HENT:      Head: Normocephalic.   Eyes:      General: Scleral icterus present.   Cardiovascular:      Rate and Rhythm: Normal rate and regular rhythm.      Pulses: Normal pulses.   Pulmonary:      Effort: Pulmonary effort is normal. No respiratory distress.      Breath sounds: Normal breath sounds.   Abdominal:      General: Abdomen is flat. There is no distension.      Palpations: Abdomen is soft.      Tenderness: There is no abdominal tenderness.   Musculoskeletal:         General: Normal range of motion.   Skin:     General: Skin is warm.   Neurological:      General: No focal deficit present.      Mental Status: He is oriented to person, place, and time.       Significant Labs:  CBC:   Recent Labs   Lab 01/28/23  0358   WBC 22.69*   RBC  4.15*   HGB 12.6*   HCT 36.4*   PLT 98*   MCV 87.7   MCH 30.4   MCHC 34.6     CMP:   Recent Labs   Lab 01/28/23  0358   GLU 90   CALCIUM 7.4*   ALBUMIN 1.5*   PROT 4.6*   *   K 4.5   CO2 18*   CL 99   BUN 76*   CREATININE 3.02*   ALKPHOS 476*   *   *   BILITOT 11.1*       Significant Diagnostics:  None    Assessment/Plan:     Active Diagnoses:    Diagnosis Date Noted POA    PRINCIPAL PROBLEM:  Sepsis [A41.9] 01/25/2023 Yes    CHF (congestive heart failure) [I50.9] 01/26/2023 Yes    Atrial fibrillation with rapid ventricular response [I48.91] 01/26/2023 Yes    Pneumonia [J18.9] 01/26/2023 Yes    Paroxysmal atrial fibrillation [I48.0]  Yes    BPH (benign prostatic hyperplasia) [N40.0]  Yes    Polycystic liver disease [Q44.6]  Not Applicable      Problems Resolved During this Admission:     Possible IR guided biopsy on Monday INR slightly elevated 1.5 will see if they are amenable to it.    Rob Ren MD  General Surgery  [facility] - [address] [place] [address]

## 2023-01-28 NOTE — PLAN OF CARE
Problem: Adult Inpatient Plan of Care  Goal: Absence of Hospital-Acquired Illness or Injury  Outcome: Ongoing, Progressing     Problem: Gas Exchange Impaired  Goal: Optimal Gas Exchange  Outcome: Ongoing, Progressing     Problem: Airway Clearance Ineffective  Goal: Effective Airway Clearance  Outcome: Ongoing, Progressing

## 2023-01-29 NOTE — PROGRESS NOTES
"[facility] Hospital Medicine  Progress Note    Patient Name: Oleksandr Landeros  MRN: [id number]  Patient Class: IP- Inpatient   Admission Date: 1/25/2023  Length of Stay: 4 days  Attending Physician: Branden Jeffrey Jr., MD  Primary Care Provider: Elliott Fournier DO        Subjective:     Principal Problem:Sepsis        HPI:  Patient is a 78 y/o male transferred to [facility] from [facility] with a cc of "Chest Pain." He states the pain was present for about an hour. At that time he was short of breath and his abdomen was hurting.  The pain is located in the upper and lower abdomen it does not radiate. Patient reports he tested positive for Covid 19, 9 days ago. Patient endorses constipation, decreased appetite, fatigue, cough, jaundice.    Patient had recent MRI of  Abdomen 12/19/2022 (per radiology report) Findings which can be seen in metastatic disease although cholangiocarcinoma is also differential concern.  Ultrasound-guided or CT-guided soft tissue sampling is recommended.There is enhancing irregular soft tissue surrounding the gallbladder.     Outlying [facility] Emergency Department findings include:  Vitals: /68, , RR 18, Temp 97.3 F, O2 96%  Labs :WBC 22.22, HGB 14.6, HCT 44.8, MCV 91.2            Na 140, K 5.1, BUN 61, CR 2.04, Glucose 95, eGFR 33          Alk Phos 469, , ALT 99 Total Bilirubin 8.5, Pro BNP 8730, Baseline 4000          UA WBCs 5-10, RBCs 10-15          BC x 2 pending  Imaging: US Abdomen  (per radiology report) Evidence of widespread hepatic metastatic disease. Suspect cholelithiasis. No abnormal biliary dilatation. Right pleural effusion. Trace ascites.  Interventions:  Approximately 2 L NS, Zosyn, Dilaudid     Patient was subsequently admitted to the hospital medicine service under the direct supervision of Dr Bernard for further evaluation and management.      Overview/Hospital Course:  No notes on file    Interval History: " Patient is in a good deal of pain this morning. His renal and liver function are deteriorating, patient given some morphine for pain. Discussed case with family regarding his code status. Patient unable to give adequate responses. Will contact niece in the afternoon regarding code status as she has power of .     Review of Systems   Constitutional:  Negative for chills and fever.   HENT:  Positive for congestion. Negative for hearing loss.    Eyes:  Negative for visual disturbance.   Respiratory:  Positive for cough, chest tightness and shortness of breath.    Cardiovascular:  Positive for chest pain. Negative for palpitations and leg swelling.   Gastrointestinal:  Positive for abdominal pain and nausea. Negative for constipation, diarrhea and vomiting.   Genitourinary:  Negative for hematuria.   Musculoskeletal:  Negative for back pain.   Neurological:  Negative for dizziness, light-headedness and headaches.   Objective:     Vital Signs (Most Recent):  Temp: 97.9 °F (36.6 °C) (01/29/23 0500)  Pulse: 62 (01/29/23 0739)  Resp: 20 (01/29/23 0739)  BP: (!) 121/57 (01/29/23 0500)  SpO2: (!) 94 % (01/29/23 0739)   Vital Signs (24h Range):  Temp:  [97.2 °F (36.2 °C)-98.1 °F (36.7 °C)] 97.9 °F (36.6 °C)  Pulse:  [] 62  Resp:  [17-20] 20  SpO2:  [94 %-100 %] 94 %  BP: (121-147)/(57-82) 121/57     Weight: 71.2 kg (157 lb)  Body mass index is 25.35 kg/m².  No intake or output data in the 24 hours ending 01/29/23 1126   Physical Exam  Vitals and nursing note reviewed.   Constitutional:       General: He is not in acute distress.     Appearance: Normal appearance. He is not ill-appearing.   HENT:      Head: Normocephalic and atraumatic.      Right Ear: External ear normal.      Left Ear: External ear normal.      Nose: Nose normal.      Mouth/Throat:      Mouth: Mucous membranes are dry.      Pharynx: Oropharynx is clear.   Eyes:      General: Scleral icterus present.      Extraocular Movements: Extraocular  movements intact.      Conjunctiva/sclera: Conjunctivae normal.      Pupils: Pupils are equal, round, and reactive to light.   Cardiovascular:      Rate and Rhythm: Regular rhythm. Tachycardia present.      Heart sounds: Normal heart sounds. No murmur heard.  Pulmonary:      Effort: Pulmonary effort is normal. No respiratory distress.      Breath sounds: Decreased breath sounds and rales present. No wheezing.   Abdominal:      General: Bowel sounds are normal.      Palpations: Abdomen is soft.      Tenderness: There is generalized abdominal tenderness and tenderness in the epigastric area and suprapubic area. There is no guarding.      Comments: Subxiphoid tenderness present    Musculoskeletal:         General: No swelling.      Cervical back: Normal range of motion and neck supple.      Right lower leg: No edema.      Left lower leg: No edema.   Skin:     General: Skin is warm and dry.      Coloration: Skin is jaundiced (slight).      Findings: No rash or wound.      Comments: Surgical scar right hip   Neurological:      Mental Status: He is alert and oriented to person, place, and time. Mental status is at baseline.      Sensory: Sensation is intact.      Motor: Weakness (left sided) present.   Psychiatric:         Mood and Affect: Mood normal.       Significant Labs: All pertinent labs within the past 24 hours have been reviewed.    Significant Imaging: I have reviewed all pertinent imaging results/findings within the past 24 hours.      Assessment/Plan:      * Sepsis  Antibiotics given-   Antibiotics (72h ago, onward)    Start     Stop Route Frequency Ordered    01/26/23 1300  piperacillin-tazobactam (ZOSYN) 4.5 g in dextrose 5 % in water (D5W) 5 % 100 mL IVPB (MB+)  (piperacillin/tazobactam IVPB in adult patients)         -- IV Every 8 hours (non-standard times) 01/26/23 1148    01/26/23 1300  azithromycin (ZITHROMAX) 500 mg in dextrose 5 % (D5W) 250 mL IVPB         -- IV Every 24 hours (non-standard times)  01/26/23 1148        Latest lactate reviewed-  No results for input(s): LACTATE in the last 72 hours.  Organ dysfunction indicated by Acute kidney injury, Acute liver injury and Acute heart failure    Fluid challenge Other- Patient to receive 2 L volume other than 30cc/kg due to Volume overload due to- Presumed CHF     Post- resuscitation assessment Yes Perfusion exam was performed within 6 hours of septic shock presentation after bolus shows Adequate tissue perfusion assessed by non-invasive monitoring       Will Not start Pressors- Levophed for MAP of 65  Source control achieved by: Antibiotics    - Suspected source includes Pneumonia vs UTI vs Cholecystitis   - qSOFA score: 0 points  - Initial 2 SIRS criteria met:  and WBC 22.22  - LR 1 L IV at 250 mL/hr x1  - BC x2 NGTD  - CXR demonstrates predominantly right sided basilar atelectasis/infiltrate and mild bilateral pleural effusion  -IR consult to thoracentesis pleural effusion for therapeutic and diagnositic benefit  - surgery consulted regarding elevated liver enzymes, they do not believe there is any surgical needs they could provide at this time, as we await cytology results from the pleural effusion, if cytology results inconclusive will likely need to get a liver biopsy.   -KUB suggestive of mild ileus due to gaseous distension of small bowel.  -HIDA scan   Heterogeneous uptake of radiotracer by the liver which correlates with recent MRI abdomen from December 29, 2022 which demonstrated multiple hepatic masses   No biliary excretion of radiotracer after 4 hours.  This appearance can be associated with diffuse hepatocellular dysfunction.  Complete common bile duct obstruction can also have this appearance, though that is felt to be unlikely given the fact that the common bile duct measured 4 mm diameter on the January 25, 2023 ultrasound.  - Will start on zosyn, azithromycin for PNA Abx  - worsening hepatic enzymes, and renal function.        Pneumonia  CXR reveals potential pneumonia on right side. Patient has been in nursing facility for 5 years now, will treat as HAP due to these concerns with Zosyn and Azithryomycin.         Atrial fibrillation with rapid ventricular response  Patient with Paroxysmal (<7 days) atrial fibrillation which is controlled currently with Calcium Channel Blocker. Patient is currently in sinus rhythm.[id number] Score: 2.  Anticoagulation not indicated due to procedure.        CHF (congestive heart failure)  - Cannot rule out new onset heart failure  - BNP 5359   - Echo    The left ventricle is normal in size with normal LV systolic function.   The estimated ejection fraction is 55%.   Atrial fibrillation observed.   Normal right ventricular size with normal right ventricular systolic function.   Mild tricuspid regurgitation.   Intermediate central venous pressure (8 mmHg).   The estimated PA systolic pressure is 31 mmHg.   Trivial pericardial effusion.  -Troponin WNL 31.8  - Monitor fluid status    Polycystic liver disease  Polycystic liver vs metastasis vs cholangicarcinoma  -HIDA scan  Heterogeneous uptake of radiotracer by the liver which correlates with recent MRI abdomen from December 29, 2022 which demonstrated multiple hepatic masses   No biliary excretion of radiotracer after 4 hours.  This appearance can be associated with diffuse hepatocellular dysfunction.  Complete common bile duct obstruction can also have this appearance, though that is felt to be unlikely given the fact that the common bile duct measured 4 mm diameter on the January 25, 2023 ultrasound.  -   Pt may benefit from liver biopsy       BPH (benign prostatic hyperplasia)  - Continue Flomax      Paroxysmal atrial fibrillation  Patient with Long standing persistent (>12 months) atrial fibrillation which is uncontrolled currently with Calcium Channel Blocker. Patient is currently in atrial fibrillation.[id number] Score: 2. HASBLED Score: 4.    -patient has episodes of tachycardia with HR in 140s and comes back down without pharmacologic intervention.   - Continue Cardizem  - EKG showed Afib with RVR  - Holding Eliquis 2/2 possible procedure. Would consider VTE prophylaxis in AM.      VTE Risk Mitigation (From admission, onward)    None          Discharge Planning   ELENA:      Code Status: Full Code   Is the patient medically ready for discharge?:     Reason for patient still in hospital (select all that apply): Treatment  Discharge Plan A: Return to nursing home                  Orestes Wood DO  Department of Hospital Medicine   [facility] - [address] [place] [address]

## 2023-01-29 NOTE — PROGRESS NOTES
[facility] - [address] [place] [address]  General Surgery  Progress Note    Subjective:     Interval History:  Patient feeling better no acute    Post-Op Info:  * No surgery found *          Medications:  Continuous Infusions:  Scheduled Meds:   albuterol-ipratropium  3 mL Nebulization Q6H    azithromycin  500 mg Intravenous Q24H    diltiaZEM  240 mg Oral Daily    piperacillin-tazobactam (ZOSYN) IVPB  4.5 g Intravenous Q8H    tamsulosin  0.4 mg Oral Daily     PRN Meds:albuterol sulfate, bisacodyL, dextromethorphan-guaiFENesin  mg/5 ml, ondansetron, simethicone, traZODone     Objective:     Vital Signs (Most Recent):  Temp: 97.3 °F (36.3 °C) (01/29/23 1202)  Pulse: 87 (Simultaneous filing. User may not have seen previous data.) (01/29/23 1202)  Resp: 16 (Simultaneous filing. User may not have seen previous data.) (01/29/23 1202)  BP: 138/60 (01/29/23 1202)  SpO2: 95 % (Simultaneous filing. User may not have seen previous data.) (01/29/23 1202) Vital Signs (24h Range):  Temp:  [97.2 °F (36.2 °C)-98.1 °F (36.7 °C)] 97.3 °F (36.3 °C)  Pulse:  [] 87  Resp:  [16-20] 16  SpO2:  [94 %-100 %] 95 %  BP: (121-147)/(57-82) 138/60     No intake or output data in the 24 hours ending 01/29/23 1326    Physical Exam  Constitutional:       General: He is not in acute distress.  HENT:      Head: Normocephalic.   Cardiovascular:      Rate and Rhythm: Normal rate and regular rhythm.      Pulses: Normal pulses.   Pulmonary:      Effort: Pulmonary effort is normal. No respiratory distress.      Breath sounds: Normal breath sounds.   Abdominal:      General: Abdomen is flat. There is no distension.      Palpations: Abdomen is soft.      Tenderness: There is no abdominal tenderness.   Musculoskeletal:         General: Normal range of motion.   Skin:     General: Skin is warm.   Neurological:      General: No focal deficit present.      Mental Status: He is oriented to person, place, and time.       Significant  Labs:  CBC:   Recent Labs   Lab 01/29/23 0347   WBC 20.84*   RBC 4.30*   HGB 12.9*   HCT 37.9*   PLT 81*   MCV 88.1   MCH 30.0   MCHC 34.0     CMP:   Recent Labs   Lab 01/29/23 0347   GLU 82   CALCIUM 7.0*   ALBUMIN 1.4*   PROT 3.8*   *   K 4.4   CO2 16*   CL 95*   BUN 79*   CREATININE 3.51*   ALKPHOS 431*   *   *   BILITOT 13.0*       Significant Diagnostics:  None    Assessment/Plan:     Active Diagnoses:    Diagnosis Date Noted POA    PRINCIPAL PROBLEM:  Sepsis [A41.9] 01/25/2023 Yes    Metastatic cancer to liver [C78.7] 01/28/2023 Unknown    CHF (congestive heart failure) [I50.9] 01/26/2023 Yes    Atrial fibrillation with rapid ventricular response [I48.91] 01/26/2023 Yes    Pneumonia [J18.9] 01/26/2023 Yes    Paroxysmal atrial fibrillation [I48.0]  Yes    BPH (benign prostatic hyperplasia) [N40.0]  Yes    Polycystic liver disease [Q44.6]  Not Applicable      Problems Resolved During this Admission:     CT liver biopsy in the morning    Rob Ren MD  General Surgery  [facility]

## 2023-01-29 NOTE — ASSESSMENT & PLAN NOTE
Patient with Long standing persistent (>12 months) atrial fibrillation which is uncontrolled currently with Calcium Channel Blocker. Patient is currently in atrial fibrillation.[id number] Score: 2. HASBLED Score: 4.   -patient has episodes of tachycardia with HR in 140s and comes back down without pharmacologic intervention.   - Continue Cardizem  - EKG showed Afib with RVR  - Holding Eliquis 2/2 possible procedure. Would consider VTE prophylaxis in AM.

## 2023-01-29 NOTE — ASSESSMENT & PLAN NOTE
Antibiotics given-   Antibiotics (72h ago, onward)    Start     Stop Route Frequency Ordered    01/26/23 1300  piperacillin-tazobactam (ZOSYN) 4.5 g in dextrose 5 % in water (D5W) 5 % 100 mL IVPB (MB+)  (piperacillin/tazobactam IVPB in adult patients)         -- IV Every 8 hours (non-standard times) 01/26/23 1148    01/26/23 1300  azithromycin (ZITHROMAX) 500 mg in dextrose 5 % (D5W) 250 mL IVPB         -- IV Every 24 hours (non-standard times) 01/26/23 1148        Latest lactate reviewed-  No results for input(s): LACTATE in the last 72 hours.  Organ dysfunction indicated by Acute kidney injury, Acute liver injury and Acute heart failure    Fluid challenge Other- Patient to receive 2 L volume other than 30cc/kg due to Volume overload due to- Presumed CHF     Post- resuscitation assessment Yes Perfusion exam was performed within 6 hours of septic shock presentation after bolus shows Adequate tissue perfusion assessed by non-invasive monitoring       Will Not start Pressors- Levophed for MAP of 65  Source control achieved by: Antibiotics    - Suspected source includes Pneumonia vs UTI vs Cholecystitis   - qSOFA score: 0 points  - Initial 2 SIRS criteria met:  and WBC 22.22  - LR 1 L IV at 250 mL/hr x1  - BC x2 NGTD  - CXR demonstrates predominantly right sided basilar atelectasis/infiltrate and mild bilateral pleural effusion  -IR consult to thoracentesis pleural effusion for therapeutic and diagnositic benefit  - surgery consulted regarding elevated liver enzymes, they do not believe there is any surgical needs they could provide at this time, as we await cytology results from the pleural effusion, if cytology results inconclusive will likely need to get a liver biopsy.   -KUB suggestive of mild ileus due to gaseous distension of small bowel.  -HIDA scan   Heterogeneous uptake of radiotracer by the liver which correlates with recent MRI abdomen from December 29, 2022 which demonstrated multiple hepatic  masses   No biliary excretion of radiotracer after 4 hours.  This appearance can be associated with diffuse hepatocellular dysfunction.  Complete common bile duct obstruction can also have this appearance, though that is felt to be unlikely given the fact that the common bile duct measured 4 mm diameter on the January 25, 2023 ultrasound.  - Will start on zosyn, azithromycin for PNA Abx  - worsening hepatic enzymes, and renal function.

## 2023-01-29 NOTE — ASSESSMENT & PLAN NOTE
Patient with Paroxysmal (<7 days) atrial fibrillation which is controlled currently with Calcium Channel Blocker. Patient is currently in sinus rhythm.[id number] Score: 2.  Anticoagulation not indicated due to procedure.

## 2023-01-29 NOTE — PLAN OF CARE
Problem: Adult Inpatient Plan of Care  Goal: Plan of Care Review  Outcome: Ongoing, Progressing  Goal: Patient-Specific Goal (Individualized)  Outcome: Ongoing, Progressing  Goal: Absence of Hospital-Acquired Illness or Injury  Outcome: Ongoing, Progressing  Goal: Optimal Comfort and Wellbeing  Outcome: Ongoing, Progressing  Goal: Readiness for Transition of Care  Outcome: Ongoing, Progressing     Problem: Skin Injury Risk Increased  Goal: Skin Health and Integrity  Outcome: Ongoing, Progressing     Problem: Adjustment to Illness (Sepsis/Septic Shock)  Goal: Optimal Coping  Outcome: Ongoing, Progressing     Problem: Bleeding (Sepsis/Septic Shock)  Goal: Absence of Bleeding  Outcome: Ongoing, Progressing     Problem: Glycemic Control Impaired (Sepsis/Septic Shock)  Goal: Blood Glucose Level Within Desired Range  Outcome: Ongoing, Progressing     Problem: Infection Progression (Sepsis/Septic Shock)  Goal: Absence of Infection Signs and Symptoms  Outcome: Ongoing, Progressing     Problem: Nutrition Impaired (Sepsis/Septic Shock)  Goal: Optimal Nutrition Intake  Outcome: Ongoing, Progressing     Problem: Fluid Imbalance (Pneumonia)  Goal: Fluid Balance  Outcome: Ongoing, Progressing     Problem: Infection (Pneumonia)  Goal: Resolution of Infection Signs and Symptoms  Outcome: Ongoing, Progressing     Problem: Respiratory Compromise (Pneumonia)  Goal: Effective Oxygenation and Ventilation  Outcome: Ongoing, Progressing     Problem: Fall Injury Risk  Goal: Absence of Fall and Fall-Related Injury  Outcome: Ongoing, Progressing     Problem: Gas Exchange Impaired  Goal: Optimal Gas Exchange  Outcome: Ongoing, Progressing     Problem: Airway Clearance Ineffective  Goal: Effective Airway Clearance  Outcome: Ongoing, Progressing    POC reviewed and continues      No

## 2023-01-29 NOTE — SUBJECTIVE & OBJECTIVE
Interval History: Patient is in a good deal of pain this morning. His renal and liver function are deteriorating, patient given some morphine for pain. Discussed case with family regarding his code status. Patient unable to give adequate responses. Will contact niece in the afternoon regarding code status as she has power of .     Review of Systems   Constitutional:  Negative for chills and fever.   HENT:  Positive for congestion. Negative for hearing loss.    Eyes:  Negative for visual disturbance.   Respiratory:  Positive for cough, chest tightness and shortness of breath.    Cardiovascular:  Positive for chest pain. Negative for palpitations and leg swelling.   Gastrointestinal:  Positive for abdominal pain and nausea. Negative for constipation, diarrhea and vomiting.   Genitourinary:  Negative for hematuria.   Musculoskeletal:  Negative for back pain.   Neurological:  Negative for dizziness, light-headedness and headaches.   Objective:     Vital Signs (Most Recent):  Temp: 97.9 °F (36.6 °C) (01/29/23 0500)  Pulse: 62 (01/29/23 0739)  Resp: 20 (01/29/23 0739)  BP: (!) 121/57 (01/29/23 0500)  SpO2: (!) 94 % (01/29/23 0739)   Vital Signs (24h Range):  Temp:  [97.2 °F (36.2 °C)-98.1 °F (36.7 °C)] 97.9 °F (36.6 °C)  Pulse:  [] 62  Resp:  [17-20] 20  SpO2:  [94 %-100 %] 94 %  BP: (121-147)/(57-82) 121/57     Weight: 71.2 kg (157 lb)  Body mass index is 25.35 kg/m².  No intake or output data in the 24 hours ending 01/29/23 1126   Physical Exam  Vitals and nursing note reviewed.   Constitutional:       General: He is not in acute distress.     Appearance: Normal appearance. He is not ill-appearing.   HENT:      Head: Normocephalic and atraumatic.      Right Ear: External ear normal.      Left Ear: External ear normal.      Nose: Nose normal.      Mouth/Throat:      Mouth: Mucous membranes are dry.      Pharynx: Oropharynx is clear.   Eyes:      General: Scleral icterus present.      Extraocular Movements:  Extraocular movements intact.      Conjunctiva/sclera: Conjunctivae normal.      Pupils: Pupils are equal, round, and reactive to light.   Cardiovascular:      Rate and Rhythm: Regular rhythm. Tachycardia present.      Heart sounds: Normal heart sounds. No murmur heard.  Pulmonary:      Effort: Pulmonary effort is normal. No respiratory distress.      Breath sounds: Decreased breath sounds and rales present. No wheezing.   Abdominal:      General: Bowel sounds are normal.      Palpations: Abdomen is soft.      Tenderness: There is generalized abdominal tenderness and tenderness in the epigastric area and suprapubic area. There is no guarding.      Comments: Subxiphoid tenderness present    Musculoskeletal:         General: No swelling.      Cervical back: Normal range of motion and neck supple.      Right lower leg: No edema.      Left lower leg: No edema.   Skin:     General: Skin is warm and dry.      Coloration: Skin is jaundiced (slight).      Findings: No rash or wound.      Comments: Surgical scar right hip   Neurological:      Mental Status: He is alert and oriented to person, place, and time. Mental status is at baseline.      Sensory: Sensation is intact.      Motor: Weakness (left sided) present.   Psychiatric:         Mood and Affect: Mood normal.       Significant Labs: All pertinent labs within the past 24 hours have been reviewed.    Significant Imaging: I have reviewed all pertinent imaging results/findings within the past 24 hours.

## 2023-01-30 NOTE — PLAN OF CARE
Chart reviewed, pt has had a decline in condition, family/patient have decided on comfort care, following

## 2023-01-30 NOTE — ASSESSMENT & PLAN NOTE
Patient with Paroxysmal (<7 days) atrial fibrillation which is controlled currently with Calcium Channel Blocker. Patient is currently in sinus rhythm.[id number] Score: 2.  Anticoagulation not indicated due to procedure.

## 2023-01-30 NOTE — PLAN OF CARE
Problem: Adult Inpatient Plan of Care  Goal: Plan of Care Review  Outcome: Ongoing, Progressing  Flowsheets (Taken 1/29/2023 2304)  Plan of Care Reviewed With: patient  Goal: Optimal Comfort and Wellbeing  Outcome: Ongoing, Progressing  Intervention: Monitor Pain and Promote Comfort  Flowsheets (Taken 1/29/2023 2304)  Pain Management Interventions:   pain management plan reviewed with patient/caregiver   pillow support provided   position adjusted   relaxation techniques promoted   quiet environment facilitated  Intervention: Provide Person-Centered Care  Flowsheets (Taken 1/29/2023 2304)  Trust Relationship/Rapport:   care explained   choices provided   emotional support provided   empathic listening provided   questions answered   questions encouraged   reassurance provided   thoughts/feelings acknowledged     Problem: Skin Injury Risk Increased  Goal: Skin Health and Integrity  Outcome: Ongoing, Progressing  Intervention: Optimize Skin Protection  Flowsheets (Taken 1/29/2023 2304)  Pressure Reduction Techniques:   frequent weight shift encouraged   heels elevated off bed   positioned off wounds   pressure points protected   weight shift assistance provided   rest period provided between sit times  Pressure Reduction Devices: positioning supports utilized  Skin Protection:   adhesive use limited   incontinence pads utilized   tubing/devices free from skin contact   transparent dressing maintained  Head of Bed (HOB) Positioning: HOB elevated     Problem: Glycemic Control Impaired (Sepsis/Septic Shock)  Goal: Blood Glucose Level Within Desired Range  Outcome: Ongoing, Progressing  Intervention: Optimize Glycemic Control  Flowsheets (Taken 1/29/2023 2304)  Glycemic Management: blood glucose monitored     Problem: Fall Injury Risk  Goal: Absence of Fall and Fall-Related Injury  Outcome: Ongoing, Progressing  Intervention: Identify and Manage Contributors  Flowsheets (Taken 1/29/2023 2304)  Self-Care Promotion:    independence encouraged   BADL personal objects within reach   BADL personal routines maintained   meal set-up provided   safe use of adaptive equipment encouraged  Medication Review/Management: medications reviewed     Problem: Gas Exchange Impaired  Goal: Optimal Gas Exchange  Outcome: Ongoing, Progressing  Intervention: Optimize Oxygenation and Ventilation  Flowsheets (Taken 1/29/2023 2303)  Airway/Ventilation Management:   airway patency maintained   calming measures promoted   pulmonary hygiene promoted  Head of Bed (HOB) Positioning: HOB elevated

## 2023-01-30 NOTE — ADDENDUM NOTE
Encounter addended by: Nimco Crespo on: 1/30/2023 10:45 AM   Actions taken: SmartForm saved, Flowsheet accepted

## 2023-01-30 NOTE — SUBJECTIVE & OBJECTIVE
Interval History: Patient in bed resting. Patient easily arousable family at bedside. Patient weak this am but does follow command and can express pain. Discussed with family outlook of patient  and his declining condition.  Family and patient decided to make him comfort care.    Review of Systems   Constitutional:  Positive for fatigue. Negative for fever.   HENT:  Negative for congestion, ear discharge, ear pain, postnasal drip and rhinorrhea.    Eyes:  Negative for pain, discharge and itching.   Respiratory:  Negative for cough, choking and shortness of breath.    Cardiovascular:  Positive for leg swelling.   Gastrointestinal:  Positive for abdominal distention and abdominal pain. Negative for anal bleeding, constipation and diarrhea.   Endocrine: Negative for cold intolerance, heat intolerance and polydipsia.   Genitourinary:  Positive for difficulty urinating.   Musculoskeletal:  Negative for arthralgias, back pain and myalgias.   Skin:  Negative for wound.   Allergic/Immunologic: Negative for environmental allergies, food allergies and immunocompromised state.   Neurological:  Positive for weakness. Negative for seizures, numbness and headaches.   Hematological: Negative.    Psychiatric/Behavioral:  Negative for agitation.    Objective:     Vital Signs (Most Recent):  Temp: 97.4 °F (36.3 °C) (01/30/23 1100)  Pulse: 74 (01/30/23 1100)  Resp: 18 (01/30/23 1100)  BP: (!) 101/35 (01/30/23 1100)  SpO2: (!) 94 % (01/30/23 1100)   Vital Signs (24h Range):  Temp:  [96.9 °F (36.1 °C)-97.9 °F (36.6 °C)] 97.4 °F (36.3 °C)  Pulse:  [68-95] 74  Resp:  [13-20] 18  SpO2:  [90 %-98 %] 94 %  BP: (101-138)/(35-60) 101/35     Weight: 71.2 kg (157 lb)  Body mass index is 25.35 kg/m².  No intake or output data in the 24 hours ending 01/30/23 1138   Physical Exam  Constitutional:       Appearance: He is obese. He is ill-appearing. He is not toxic-appearing.   HENT:      Head: Normocephalic.      Right Ear: Tympanic membrane normal.       Left Ear: Tympanic membrane normal.      Nose: Nose normal.      Mouth/Throat:      Mouth: Mucous membranes are moist.      Pharynx: Oropharynx is clear.   Eyes:      Conjunctiva/sclera: Conjunctivae normal.      Pupils: Pupils are equal, round, and reactive to light.   Cardiovascular:      Rate and Rhythm: Normal rate and regular rhythm.      Pulses: Normal pulses.      Heart sounds: Normal heart sounds.   Pulmonary:      Effort: Pulmonary effort is normal.      Breath sounds: Rhonchi present.   Abdominal:      General: Abdomen is flat. Bowel sounds are normal. There is distension.      Tenderness: There is abdominal tenderness.   Musculoskeletal:         General: Tenderness present. Normal range of motion.      Cervical back: Normal range of motion.      Right lower leg: Edema present.      Left lower leg: Edema present.   Skin:     General: Skin is warm and dry.      Capillary Refill: Capillary refill takes less than 2 seconds.      Coloration: Skin is jaundiced.   Neurological:      Motor: Weakness present.   Psychiatric:         Mood and Affect: Mood normal.         Behavior: Behavior normal.       Significant Labs: All pertinent labs within the past 24 hours have been reviewed.  BMP:   Recent Labs   Lab 01/30/23  0351   GLU 76   *   K 5.3*   CL 92*   CO2 15*   BUN 93*   CREATININE 4.35*   CALCIUM 7.3*     CBC:   Recent Labs   Lab 01/29/23  0347 01/30/23  0351   WBC 20.84* 19.07*   HGB 12.9* 12.3*   HCT 37.9* 34.8*   PLT 81* 94*       Significant Imaging: I have reviewed all pertinent imaging results/findings within the past 24 hours.

## 2023-01-30 NOTE — ASSESSMENT & PLAN NOTE
Polycystic liver vs metastasis vs cholangicarcinoma  -HIDA scan  Heterogeneous uptake of radiotracer by the liver which correlates with recent MRI abdomen from December 29, 2022 which demonstrated multiple hepatic masses   No biliary excretion of radiotracer after 4 hours.  This appearance can be associated with diffuse hepatocellular dysfunction.  Complete common bile duct obstruction can also have this appearance, though that is felt to be unlikely given the fact that the common bile duct measured 4 mm diameter on the January 25, 2023 ultrasound.  -   Pt may benefit from liver biopsy    1/30/23  -Family Decided to make patient comfort measures. They declined further imaging or procedures for now  -All scheduled medications discontinued.   -Continue Pain medication and Nausea medication

## 2023-01-30 NOTE — PROGRESS NOTES
"[facility] - [address] [place] [address]  Adult Nutrition  Follow-up Note         Reason for Assessment  Reason For Assessment: RD follow-up  Nutrition Risk Screen: no indicators present    RD follow up.    Recommend advance diet as medically appropriate and tolerated. Recommend Cardiac Diet and continuing Ensure Max Protein TID as appropriate. Monitor need/acceptance for appetite stimulant. Encourage good PO intakes.    Per surgery 1/29:"CT liver biopsy in the morning"    Per MD since last RD review:  "1/26: Pt was resting comfortably in bed this AM. At present, he endorses chest tightness and dyspnea and denies palpitations. Upon palpation, the patient had significant subxiphoid/epigastric tenderness, and diffuse lower abdominal tenderness. He denies any previous episodes of chest pain. CXR suggestive of possible PNA or atelectasis. KUB findings suggestive of mild ileus and atherosclerotic calcification. ECHO performed, results pending. Telemetry shows HR of 108 with atrial fibrillation.  1/27: Patient is improving as far as his breathing is concerned after his recent thoracentesis yesterday. Patients is sitting comfortably. He still states he is having some abdominal pain. He is more jaundiced today than yesterday, his liver enzymes are trending upwards. Patients nuclear scan yesterday revealed obstructing pattern, but ultrasound reveals normal sized bile ducts. Differential includes potenital liver masses causing the elevations. Patient pleural fluid was exudative in nature with a high number of RBCs which is concerning for malignancy.   1/28: Patient is sitting comfortably in bed. Family reports he slept well last night and did not have any issues overnight. Patient states his breathing is improving and says his breathing treatments are helping him. Patients lungs sound better than yesterday and are improving.   1/29: Patient is in a good deal of pain this morning. His renal and liver function are " "deteriorating, patient given some morphine for pain. Discussed case with family regarding his code status. Patient unable to give adequate responses. Will contact niece in the afternoon regarding code status as she has power of ."    Patient currently on Clear Liquid Diet with no PO % intake documented yet. Pt previously on Cardiac Diet with 0% PO intake documented on 1/29. Patient receiving Ensure Max Protein oral nutrition supplement TID. Provides 150kcal and 30g protein each. Recommend advance diet back to Cardiac Diet as appropriate.     Last BM 1/28 per flowsheets. Patient with stable weight history. Will continue to monitor PO intake, weight trend, meds, labs, updates in patient condition. RD following.    Malnutrition  Is Patient Malnourished: No    RD seen for MST 1/26. Per RD note: RD spoke with patient and family member at bed side. He is a weight loss of 4.2% x 2 months- not significant. He has a reported appetite decrease ~ x 1 week prior to admission.     Patient is at risk for malnutrition due to poor appetite with decreased intake.     Nutrition Diagnosis  Increased protein Needs   related to  recent illness  as evidenced by pt with sepsis    Nutrition Diagnosis Status: Chronic/ continues    Nutrition Risk  Level of Risk/Frequency of Follow-up: high   Chewing or Swallowing Difficulty?: No Chewing or swallowing difficulty    Estimated/Assessed Needs    Temp: 97.4 °F (36.3 °C)Axillary  Weight Used For Calorie Calculations: 71.2 kg (156 lb 15.5 oz)   Energy Need Method: Kcal/kg (30-35kcal/kg) Energy Calorie Requirements (kcal): 2136-2492kcal  Weight Used For Protein Calculations: 71.2 kg (156 lb 15.5 oz)  Protein Requirements: 107-143g pro (1.5-2.0g pro/kg d/t sepsis)       RDA Method (mL): 2136     Nutrition Prescription / Recommendations  Recommendation/Intervention: Recommend advance diet as medically appropriate and tolerated. Recommend Cardiac Diet and continuing Ensure Max Protein TID as " appropriate. Monitor need/acceptance for appetite stimulant. Encourage good PO intakes.  Goals: increased PO intake, weight maintenance, acceptance of supplements  Nutrition Goal Status: progressing towards goal  Current Diet Order: Clear Liquid Diet  Nutrition Order Comments: diet order changed 1/30  Oral Nutrition Supplement: Ensure Max Protein TID  Recommended Diet:  Cardiac Diet  Recommended Oral Supplement: Ensure Max Protein [150 kcals, 30g Protein, 6g Carbs(2g Fiber, 1g Sugar), 1.5g Fat] three times daily  Is Nutrition Support Recommended: No  Is Education Recommended: No    Monitor and Evaluation  % current Intake: Other: PO intake 0% on 1/29  % intake to meet estimated needs: 75 - 100 %  Food and Nutrient Intake: energy intake, food and beverage intake  Food and Nutrient Adminstration: diet order  Anthropometric Measurements: weight, weight change, body mass index  Biochemical Data, Medical Tests and Procedures: electrolyte and renal panel, gastrointestinal profile, glucose/endocrine profile, inflammatory profile, lipid profile  Nutrition-Focused Physical Findings: overall appearance, extremities, muscles and bones, head and eyes, skin     Current Medical Diagnosis and Past Medical History  Diagnosis: infection/sepsis  Past Medical History:   Diagnosis Date    Age-related nuclear cataract     Anticoagulant long-term use     Arthritis     BPH (benign prostatic hyperplasia)     Coronary artery disease     Essential (primary) hypertension     Hemiparesis affecting left side as late effect of stroke     Mixed hyperlipidemia     Paroxysmal atrial fibrillation     Presbyopia     Stroke      Nutrition/Diet History  Food Allergies: NKFA  Factors Affecting Nutritional Intake: clear liquid diet    Lab/Procedures/Meds  Recent Labs   Lab 01/30/23  0351   *   K 5.3*   BUN 93*   CREATININE 4.35*   GLU 76   CALCIUM 7.3*   ALBUMIN 1.3*   CL 92*   *   *     Last A1c: No results found for: HGBA1C  Lab  "Results   Component Value Date    RBC 4.08 (L) 01/30/2023    HGB 12.3 (L) 01/30/2023    HCT 34.8 (L) 01/30/2023    MCV 85.3 01/30/2023    MCH 30.1 01/30/2023    MCHC 35.3 01/30/2023    TIBC 167 (L) 11/05/2022     Pertinent Labs Reviewed: reviewed  Pertinent Labs Comments: Na 126 (L), K 5.3 (H), Cl 92 (L) - replete to WNL as needed, BUN 93 (H), creatinine 4.35 (H), BUN/CREAT RATIO 21 (H), eGFR 13 (L), Ca 7.3 (L) - pt with no PMH of CKD, will continue to monitor renal labs, alk phos 384 (H),   Total protein 4.0 (L), albumin 1.3 (L) - pt with reported reduced appetite/PO intake, total bilirubin 15.1 (H),  (H),  (H) - pt with polycystic liver disease, CO2 15 (L), Anion gap 24 (H) - altered labs likely r/t pt with sepsis  Pertinent Medications Reviewed: reviewed  Pertinent Medications Comments: fentanyl, morphine PRN    Anthropometrics  Temp: 97.4 °F (36.3 °C)  Height: 5' 5.98" (167.6 cm)  Height (inches): 65.98 in  Weight Method: Bed Scale  Weight: 71.2 kg (157 lb)  Weight (lb): 157 lb  Ideal Body Weight (IBW), Male: 141.88 lb  % Ideal Body Weight, Male (lb): 110.66 %  BMI (Calculated): 25.4     Nutrition by Nursing  Diet/Nutrition Received: consistent carb/diabetic diet  Intake (%): 0%  Last Bowel Movement: 01/28/23    Nutrition Follow-Up  RD Follow-up?: Yes  "

## 2023-01-30 NOTE — ASSESSMENT & PLAN NOTE
Antibiotics given-   Antibiotics (72h ago, onward)    None        Latest lactate reviewed-  No results for input(s): LACTATE in the last 72 hours.  Organ dysfunction indicated by Acute kidney injury, Acute liver injury and Acute heart failure    Fluid challenge Other- Patient to receive 2 L volume other than 30cc/kg due to Volume overload due to- Presumed CHF     Post- resuscitation assessment Yes Perfusion exam was performed within 6 hours of septic shock presentation after bolus shows Adequate tissue perfusion assessed by non-invasive monitoring       Will Not start Pressors- Levophed for MAP of 65  Source control achieved by: Antibiotics    - Suspected source includes Pneumonia vs UTI vs Cholecystitis   - qSOFA score: 0 points  - Initial 2 SIRS criteria met:  and WBC 22.22  - LR 1 L IV at 250 mL/hr x1  - BC x2 NGTD  - CXR demonstrates predominantly right sided basilar atelectasis/infiltrate and mild bilateral pleural effusion  -IR consult to thoracentesis pleural effusion for therapeutic and diagnositic benefit  - surgery consulted regarding elevated liver enzymes, they do not believe there is any surgical needs they could provide at this time, as we await cytology results from the pleural effusion, if cytology results inconclusive will likely need to get a liver biopsy.   -KUB suggestive of mild ileus due to gaseous distension of small bowel.  -HIDA scan   Heterogeneous uptake of radiotracer by the liver which correlates with recent MRI abdomen from December 29, 2022 which demonstrated multiple hepatic masses   No biliary excretion of radiotracer after 4 hours.  This appearance can be associated with diffuse hepatocellular dysfunction.  Complete common bile duct obstruction can also have this appearance, though that is felt to be unlikely given the fact that the common bile duct measured 4 mm diameter on the January 25, 2023 ultrasound.  - Will start on zosyn, azithromycin for PNA Abx  - worsening hepatic  enzymes, and renal function.

## 2023-01-30 NOTE — PROGRESS NOTES
"[facility] Hospital Medicine  Progress Note    Patient Name: Oleksandr Landeros  MRN: [id number]  Patient Class: IP- Inpatient   Admission Date: 1/25/2023  Length of Stay: 5 days  Attending Physician: Branden Jeffrey Jr., MD  Primary Care Provider: Elliott Fournier DO        Subjective:     Principal Problem:Polycystic liver disease        HPI:  Patient is a 76 y/o male transferred to [facility] from [place] with a cc of "Chest Pain." He states the pain was present for about an hour. At that time he was short of breath and his abdomen was hurting.  The pain is located in the upper and lower abdomen it does not radiate. Patient reports he tested positive for Covid 19, 9 days ago. Patient endorses constipation, decreased appetite, fatigue, cough, jaundice.    Patient had recent MRI of  Abdomen 12/19/2022 (per radiology report) Findings which can be seen in metastatic disease although cholangiocarcinoma is also differential concern.  Ultrasound-guided or CT-guided soft tissue sampling is recommended.There is enhancing irregular soft tissue surrounding the gallbladder.     Outlying [facility] Emergency Department findings include:  Vitals: /68, , RR 18, Temp 97.3 F, O2 96%  Labs :WBC 22.22, HGB 14.6, HCT 44.8, MCV 91.2            Na 140, K 5.1, BUN 61, CR 2.04, Glucose 95, eGFR 33          Alk Phos 469, , ALT 99 Total Bilirubin 8.5, Pro BNP 8730, Baseline 4000          UA WBCs 5-10, RBCs 10-15          BC x 2 pending  Imaging: US Abdomen  (per radiology report) Evidence of widespread hepatic metastatic disease. Suspect cholelithiasis. No abnormal biliary dilatation. Right pleural effusion. Trace ascites.  Interventions:  Approximately 2 L NS, Zosyn, Dilaudid     Patient was subsequently admitted to the hospital medicine service under the direct supervision of Dr Bernard for further evaluation and management.      Overview/Hospital Course:  No notes on " file    Interval History: Patient in bed resting. Patient easily arousable family at bedside. Patient weak this am but does follow command and can express pain. Discussed with family outlook of patient  and his declining condition.  Family and patient decided to make him comfort care.    Review of Systems   Constitutional:  Positive for fatigue. Negative for fever.   HENT:  Negative for congestion, ear discharge, ear pain, postnasal drip and rhinorrhea.    Eyes:  Negative for pain, discharge and itching.   Respiratory:  Negative for cough, choking and shortness of breath.    Cardiovascular:  Positive for leg swelling.   Gastrointestinal:  Positive for abdominal distention and abdominal pain. Negative for anal bleeding, constipation and diarrhea.   Endocrine: Negative for cold intolerance, heat intolerance and polydipsia.   Genitourinary:  Positive for difficulty urinating.   Musculoskeletal:  Negative for arthralgias, back pain and myalgias.   Skin:  Negative for wound.   Allergic/Immunologic: Negative for environmental allergies, food allergies and immunocompromised state.   Neurological:  Positive for weakness. Negative for seizures, numbness and headaches.   Hematological: Negative.    Psychiatric/Behavioral:  Negative for agitation.    Objective:     Vital Signs (Most Recent):  Temp: 97.4 °F (36.3 °C) (01/30/23 1100)  Pulse: 74 (01/30/23 1100)  Resp: 18 (01/30/23 1100)  BP: (!) 101/35 (01/30/23 1100)  SpO2: (!) 94 % (01/30/23 1100)   Vital Signs (24h Range):  Temp:  [96.9 °F (36.1 °C)-97.9 °F (36.6 °C)] 97.4 °F (36.3 °C)  Pulse:  [68-95] 74  Resp:  [13-20] 18  SpO2:  [90 %-98 %] 94 %  BP: (101-138)/(35-60) 101/35     Weight: 71.2 kg (157 lb)  Body mass index is 25.35 kg/m².  No intake or output data in the 24 hours ending 01/30/23 1138   Physical Exam  Constitutional:       Appearance: He is obese. He is ill-appearing. He is not toxic-appearing.   HENT:      Head: Normocephalic.      Right Ear: Tympanic membrane  normal.      Left Ear: Tympanic membrane normal.      Nose: Nose normal.      Mouth/Throat:      Mouth: Mucous membranes are moist.      Pharynx: Oropharynx is clear.   Eyes:      Conjunctiva/sclera: Conjunctivae normal.      Pupils: Pupils are equal, round, and reactive to light.   Cardiovascular:      Rate and Rhythm: Normal rate and regular rhythm.      Pulses: Normal pulses.      Heart sounds: Normal heart sounds.   Pulmonary:      Effort: Pulmonary effort is normal.      Breath sounds: Rhonchi present.   Abdominal:      General: Abdomen is flat. Bowel sounds are normal. There is distension.      Tenderness: There is abdominal tenderness.   Musculoskeletal:         General: Tenderness present. Normal range of motion.      Cervical back: Normal range of motion.      Right lower leg: Edema present.      Left lower leg: Edema present.   Skin:     General: Skin is warm and dry.      Capillary Refill: Capillary refill takes less than 2 seconds.      Coloration: Skin is jaundiced.   Neurological:      Motor: Weakness present.   Psychiatric:         Mood and Affect: Mood normal.         Behavior: Behavior normal.       Significant Labs: All pertinent labs within the past 24 hours have been reviewed.  BMP:   Recent Labs   Lab 01/30/23  0351   GLU 76   *   K 5.3*   CL 92*   CO2 15*   BUN 93*   CREATININE 4.35*   CALCIUM 7.3*     CBC:   Recent Labs   Lab 01/29/23  0347 01/30/23  0351   WBC 20.84* 19.07*   HGB 12.9* 12.3*   HCT 37.9* 34.8*   PLT 81* 94*       Significant Imaging: I have reviewed all pertinent imaging results/findings within the past 24 hours.      Assessment/Plan:      * Polycystic liver disease  Polycystic liver vs metastasis vs cholangicarcinoma  -HIDA scan  Heterogeneous uptake of radiotracer by the liver which correlates with recent MRI abdomen from December 29, 2022 which demonstrated multiple hepatic masses   No biliary excretion of radiotracer after 4 hours.  This appearance can be associated  with diffuse hepatocellular dysfunction.  Complete common bile duct obstruction can also have this appearance, though that is felt to be unlikely given the fact that the common bile duct measured 4 mm diameter on the January 25, 2023 ultrasound.  -   Pt may benefit from liver biopsy    1/30/23  -Family Decided to make patient comfort measures. They declined further imaging or procedures for now  -All scheduled medications discontinued.   -Continue Pain medication and Nausea medication      Pneumonia  CXR reveals potential pneumonia on right side. Patient has been in nursing facility for 5 years now, will treat as HAP due to these concerns with Zosyn and Azithryomycin.         Paroxysmal atrial fibrillation  Patient with Long standing persistent (>12 months) atrial fibrillation which is uncontrolled currently with Calcium Channel Blocker. Patient is currently in atrial fibrillation.[id number] Score: 2. HASBLED Score: 4.   -patient has episodes of tachycardia with HR in 140s and comes back down without pharmacologic intervention.   - Continue Cardizem  - EKG showed Afib with RVR  - Holding Eliquis 2/2 possible procedure. Would consider VTE prophylaxis in AM.    CHF (congestive heart failure)  - Cannot rule out new onset heart failure  - BNP 5359   - Echo    The left ventricle is normal in size with normal LV systolic function.   The estimated ejection fraction is 55%.   Atrial fibrillation observed.   Normal right ventricular size with normal right ventricular systolic function.   Mild tricuspid regurgitation.   Intermediate central venous pressure (8 mmHg).   The estimated PA systolic pressure is 31 mmHg.   Trivial pericardial effusion.  -Troponin WNL 31.8  - Monitor fluid status    Atrial fibrillation with rapid ventricular response  Patient with Paroxysmal (<7 days) atrial fibrillation which is controlled currently with Calcium Channel Blocker. Patient is currently in sinus rhythm.[id number] Score: 2.   Anticoagulation not indicated due to procedure.        BPH (benign prostatic hyperplasia)  - Continue Flomax      Sepsis  Antibiotics given-   Antibiotics (72h ago, onward)    None        Latest lactate reviewed-  No results for input(s): LACTATE in the last 72 hours.  Organ dysfunction indicated by Acute kidney injury, Acute liver injury and Acute heart failure    Fluid challenge Other- Patient to receive 2 L volume other than 30cc/kg due to Volume overload due to- Presumed CHF     Post- resuscitation assessment Yes Perfusion exam was performed within 6 hours of septic shock presentation after bolus shows Adequate tissue perfusion assessed by non-invasive monitoring       Will Not start Pressors- Levophed for MAP of 65  Source control achieved by: Antibiotics    - Suspected source includes Pneumonia vs UTI vs Cholecystitis   - qSOFA score: 0 points  - Initial 2 SIRS criteria met:  and WBC 22.22  - LR 1 L IV at 250 mL/hr x1  - BC x2 NGTD  - CXR demonstrates predominantly right sided basilar atelectasis/infiltrate and mild bilateral pleural effusion  -IR consult to thoracentesis pleural effusion for therapeutic and diagnositic benefit  - surgery consulted regarding elevated liver enzymes, they do not believe there is any surgical needs they could provide at this time, as we await cytology results from the pleural effusion, if cytology results inconclusive will likely need to get a liver biopsy.   -KUB suggestive of mild ileus due to gaseous distension of small bowel.  -HIDA scan   Heterogeneous uptake of radiotracer by the liver which correlates with recent MRI abdomen from December 29, 2022 which demonstrated multiple hepatic masses   No biliary excretion of radiotracer after 4 hours.  This appearance can be associated with diffuse hepatocellular dysfunction.  Complete common bile duct obstruction can also have this appearance, though that is felt to be unlikely given the fact that the common bile duct  measured 4 mm diameter on the January 25, 2023 ultrasound.  - Will start on zosyn, azithromycin for PNA Abx  - worsening hepatic enzymes, and renal function.         VTE Risk Mitigation (From admission, onward)    None          Discharge Planning   ELENA:      Code Status: DNR   Is the patient medically ready for discharge?:     Reason for patient still in hospital (select all that apply): Treatment  Discharge Plan A: Return to nursing home                  Harpal Patel MD  Department of Hospital Medicine   [facility] - [address] [place] [address]

## 2023-01-31 PROBLEM — I48.91 ATRIAL FIBRILLATION WITH RAPID VENTRICULAR RESPONSE: Status: RESOLVED | Noted: 2023-01-01 | Resolved: 2023-01-01

## 2023-01-31 PROBLEM — I50.9 CHF (CONGESTIVE HEART FAILURE): Status: RESOLVED | Noted: 2023-01-01 | Resolved: 2023-01-01

## 2023-01-31 PROBLEM — C78.7 METASTATIC CANCER TO LIVER: Status: RESOLVED | Noted: 2023-01-01 | Resolved: 2023-01-01

## 2023-01-31 PROBLEM — A41.9 SEPSIS: Status: RESOLVED | Noted: 2023-01-01 | Resolved: 2023-01-01

## 2023-01-31 PROBLEM — J18.9 PNEUMONIA: Status: RESOLVED | Noted: 2023-01-01 | Resolved: 2023-01-01

## 2023-01-31 NOTE — ASSESSMENT & PLAN NOTE
Polycystic liver vs metastasis vs cholangicarcinoma  -HIDA scan  Heterogeneous uptake of radiotracer by the liver which correlates with recent MRI abdomen from December 29, 2022 which demonstrated multiple hepatic masses   No biliary excretion of radiotracer after 4 hours.  This appearance can be associated with diffuse hepatocellular dysfunction.  Complete common bile duct obstruction can also have this appearance, though that is felt to be unlikely given the fact that the common bile duct measured 4 mm diameter on the January 25, 2023 ultrasound.  -   1/30/23  -Family Decided to make patient comfort measures. They declined further imaging or procedures for now  -All scheduled medications discontinued.   -Continue Pain medication and Nausea medication

## 2023-01-31 NOTE — DISCHARGE SUMMARY
"[facility] - [address] [place] [address] Medicine  Discharge Summary      Patient Name: Oleksandr Landeros  MRN: [id number]  JOE: [id number]  Patient Class: IP- Inpatient  Admission Date: 1/25/2023  Hospital Length of Stay: 6 days  Discharge Date and Time: No discharge date for patient encounter.  Attending Physician: Branden Jeffrey Jr., MD   Discharging Provider: Branden Jeffrey Jr, MD  Primary Care Provider: Elliott Fournier DO    Primary Care Team: Networked reference to record PCT     HPI:   Patient is a 78 y/o male transferred to [facility] from [place] with a cc of "Chest Pain." He states the pain was present for about an hour. At that time he was short of breath and his abdomen was hurting.  The pain is located in the upper and lower abdomen it does not radiate. Patient reports he tested positive for Covid 19, 9 days ago. Patient endorses constipation, decreased appetite, fatigue, cough, jaundice.    Patient had recent MRI of  Abdomen 12/19/2022 (per radiology report) Findings which can be seen in metastatic disease although cholangiocarcinoma is also differential concern.  Ultrasound-guided or CT-guided soft tissue sampling is recommended.There is enhancing irregular soft tissue surrounding the gallbladder.     Outlying [facility] Emergency Department findings include:  Vitals: /68, , RR 18, Temp 97.3 F, O2 96%  Labs :WBC 22.22, HGB 14.6, HCT 44.8, MCV 91.2            Na 140, K 5.1, BUN 61, CR 2.04, Glucose 95, eGFR 33          Alk Phos 469, , ALT 99 Total Bilirubin 8.5, Pro BNP 8730, Baseline 4000          UA WBCs 5-10, RBCs 10-15          BC x 2 pending  Imaging: US Abdomen  (per radiology report) Evidence of widespread hepatic metastatic disease. Suspect cholelithiasis. No abnormal biliary dilatation. Right pleural effusion. Trace ascites.  Interventions:  Approximately 2 L NS, Zosyn, Dilaudid     Patient was subsequently admitted to the hospital medicine service " under the direct supervision of Dr Bernard for further evaluation and management.      * No surgery found *      Hospital Course:   Patient admitted with jaundice and SOB.  Had R pleural effusion removed by thoracentesis. Improved SOB after procedure.  Cytology with atypical cells but not diagnostic.  He had steadily rising bilirubin to 15 and creatinine to 4.5 when the decision was made to stop checking labs and shift to palliative care.  He had become quite obtunded. Abdominal US was consistent with metastatic disease and HIDA consistent with complete hepatocellular dysfunction. Consideration was give to CT bx , but family elected palliative care instead.  He was obviously approaching death on rounds this morning. Family made aware.  Those that wished were at bedside.  He was noted to be without signs of life this afternoon and was pronounced by me at 349pm with family at bedside.         Final diagnosis -     1) Metastatic cancer to the liver, unknown primary  2) Liver failure  3) renal failure.        Goals of Care Treatment Preferences:  Code Status: DNR      Consults:   Consults (From admission, onward)        Status Ordering Provider     Inpatient consult to General Surgery  Once        Provider:  (Not yet assigned)    SOL Hein          No new Assessment & Plan notes have been filed under this hospital service since the last note was generated.  Service: Hospital Medicine    Final Active Diagnoses:    Diagnosis Date Noted POA    Metastatic cancer to liver [C78.7] 01/28/2023 Yes      Problems Resolved During this Admission:    Diagnosis Date Noted Date Resolved POA    PRINCIPAL PROBLEM:  Polycystic liver disease [Q44.6]  01/31/2023 Not Applicable    CHF (congestive heart failure) [I50.9] 01/26/2023 01/31/2023 Yes    Atrial fibrillation with rapid ventricular response [I48.91] 01/26/2023 01/31/2023 Yes    Pneumonia [J18.9] 01/26/2023 01/31/2023 Yes    Paroxysmal atrial fibrillation  [I48.0]  2023 Yes    BPH (benign prostatic hyperplasia) [N40.0]  2023 Yes    Sepsis [A41.9] 2023 Yes       Discharged Condition:     Disposition: Another Health Care Inst*    Follow Up:    Patient Instructions:   No discharge procedures on file.    Significant Diagnostic Studies: Labs: All labs within the past 24 hours have been reviewed    Pending Diagnostic Studies:     Procedure Component Value Units Date/Time    EKG 12-lead [[id number]]     Order Status: Sent Lab Status: No result     EKG 12-lead [[id number]] Collected: 23    Order Status: Sent Lab Status: In process Updated: 23    Narrative:      Test Reason : R07.9,    Vent. Rate : 108 BPM     Atrial Rate : 105 BPM     P-R Int : 000 ms          QRS Dur : 102 ms      QT Int : 356 ms       P-R-T Axes : 000 098 -74 degrees     QTc Int : 477 ms    Atrial fibrillation with rapid ventricular response  Rightward axis  Septal infarct ,age undetermined  T wave abnormality, consider inferior ischemia or digitalis effect  Abnormal ECG  No previous ECGs available    Referred By: COCO ANDERSON           Confirmed By:     EXTRA TUBES [[id number]] Collected: 23 161    Order Status: Sent Lab Status: In process Updated: 23    Specimen: Blood, Venous     Narrative:      The following orders were created for panel order EXTRA TUBES.  Procedure                               Abnormality         Status                     ---------                               -----------         ------                     Red Top Hold[[id number]]                                     In process                   Please view results for these tests on the individual orders.    EXTRA TUBES [[id number]] Collected: 23    Order Status: Sent Lab Status: In process Updated: 23    Specimen: Blood, Venous     Narrative:      The following orders were created for panel order EXTRA TUBES.  Procedure                                Abnormality         Status                     ---------                               -----------         ------                     Red Top Hold[[id number]]                                     In process                   Please view results for these tests on the individual orders.    Echo [[id number]]     Order Status: Sent Lab Status: No result          Medications:  None    Indwelling Lines/Drains at time of discharge:   Lines/Drains/Airways     None                 Time spent on the discharge of patient: 35 minutes         Branden Jeffrey Jr, MD  Department of Hospital Medicine  [facility]

## 2023-01-31 NOTE — PLAN OF CARE
Problem: Adult Inpatient Plan of Care  Goal: Plan of Care Review  Outcome: Ongoing, Progressing  Flowsheets (Taken 1/30/2023 2021)  Plan of Care Reviewed With: patient  Goal: Optimal Comfort and Wellbeing  Outcome: Ongoing, Progressing  Intervention: Monitor Pain and Promote Comfort  Flowsheets (Taken 1/30/2023 2021)  Pain Management Interventions:   pain management plan reviewed with patient/caregiver   pillow support provided   position adjusted   relaxation techniques promoted   quiet environment facilitated  Intervention: Provide Person-Centered Care  Flowsheets (Taken 1/30/2023 2021)  Trust Relationship/Rapport:   care explained   choices provided   emotional support provided   empathic listening provided   questions answered   questions encouraged   reassurance provided   thoughts/feelings acknowledged     Problem: Skin Injury Risk Increased  Goal: Skin Health and Integrity  Outcome: Ongoing, Progressing  Intervention: Optimize Skin Protection  Flowsheets (Taken 1/30/2023 2021)  Pressure Reduction Techniques:   frequent weight shift encouraged   heels elevated off bed   positioned off wounds   pressure points protected   weight shift assistance provided   rest period provided between sit times  Skin Protection:   adhesive use limited   incontinence pads utilized   tubing/devices free from skin contact   transparent dressing maintained  Head of Bed (HOB) Positioning: HOB elevated     Problem: Glycemic Control Impaired (Sepsis/Septic Shock)  Goal: Blood Glucose Level Within Desired Range  Outcome: Ongoing, Progressing  Intervention: Optimize Glycemic Control  Flowsheets (Taken 1/30/2023 2021)  Glycemic Management: blood glucose monitored     Problem: Fall Injury Risk  Goal: Absence of Fall and Fall-Related Injury  Outcome: Ongoing, Progressing  Intervention: Identify and Manage Contributors  Flowsheets (Taken 1/30/2023 2021)  Self-Care Promotion:   independence encouraged   BADL personal objects within reach    RICH personal routines maintained   meal set-up provided   safe use of adaptive equipment encouraged  Medication Review/Management: medications reviewed

## 2023-01-31 NOTE — PLAN OF CARE
Problem: Adult Inpatient Plan of Care  Goal: Plan of Care Review  Outcome: Ongoing, Progressing  Goal: Patient-Specific Goal (Individualized)  Outcome: Ongoing, Progressing  Flowsheets (Taken 1/30/2023 5449)  Anxieties, Fears or Concerns: comfort care  Individualized Care Needs: comfort care  Goal: Absence of Hospital-Acquired Illness or Injury  Outcome: Ongoing, Progressing  Goal: Optimal Comfort and Wellbeing  Outcome: Ongoing, Progressing  Goal: Readiness for Transition of Care  Outcome: Ongoing, Progressing     Problem: Skin Injury Risk Increased  Goal: Skin Health and Integrity  Outcome: Ongoing, Progressing     Problem: Adjustment to Illness (Sepsis/Septic Shock)  Goal: Optimal Coping  Outcome: Ongoing, Progressing     Problem: Bleeding (Sepsis/Septic Shock)  Goal: Absence of Bleeding  Outcome: Ongoing, Progressing     Problem: Glycemic Control Impaired (Sepsis/Septic Shock)  Goal: Blood Glucose Level Within Desired Range  Outcome: Ongoing, Progressing     Problem: Infection Progression (Sepsis/Septic Shock)  Goal: Absence of Infection Signs and Symptoms  Outcome: Ongoing, Progressing     Problem: Nutrition Impaired (Sepsis/Septic Shock)  Goal: Optimal Nutrition Intake  Outcome: Ongoing, Progressing     Problem: Fluid Imbalance (Pneumonia)  Goal: Fluid Balance  Outcome: Ongoing, Progressing     Problem: Infection (Pneumonia)  Goal: Resolution of Infection Signs and Symptoms  Outcome: Ongoing, Progressing     Problem: Respiratory Compromise (Pneumonia)  Goal: Effective Oxygenation and Ventilation  Outcome: Ongoing, Progressing     Problem: Fall Injury Risk  Goal: Absence of Fall and Fall-Related Injury  Outcome: Ongoing, Progressing     Problem: Gas Exchange Impaired  Goal: Optimal Gas Exchange  Outcome: Ongoing, Progressing     Problem: Airway Clearance Ineffective  Goal: Effective Airway Clearance  Outcome: Ongoing, Progressing   POC reviewed and continues

## 2023-01-31 NOTE — HOSPITAL COURSE
Patient admitted with jaundice and SOB.  Had R pleural effusion removed by thoracentesis. Improved SOB after procedure.  Cytology with atypical cells but not diagnostic.  He had steadily rising bilirubin to 15 and creatinine to 4.5 when the decision was made to stop checking labs and shift to palliative care.  He had become quite obtunded. Abdominal US was consistent with metastatic disease and HIDA consistent with complete hepatocellular dysfunction. Consideration was give to CT bx , but family elected palliative care instead.  He was obviously approaching death on rounds this morning. Family made aware.  Those that wished were at bedside.  He was noted to be without signs of life this afternoon and was pronounced by me at 349pm with family at bedside.         Final diagnosis -     1) Metastatic cancer to the liver, unknown primary  2) Liver failure  3) renal failure.

## 2023-01-31 NOTE — PHYSICIAN QUERY
PT Name: lOeksandr Landeros  MR #: [id number]     DOCUMENTATION CLARIFICATION     CDS/: ALVA PRECIADO MSN RN              Contact information:[email or web address]  This form is a permanent document in the medical record.     Query Date: January 31, 2023    By submitting this query, we are merely seeking further clarification of documentation.  Please utilize your independent clinical judgment when addressing the question(s) below.    The Medical Record contains the following   Indicators Supporting Clinical Findings Location in Medical Record   x Heart Failure documented Patient with WBC 22K and with acute CHF (carefully volume resuscitating) increased lactic acid which is decreasing on trend and tachycardia and tachypnea      CHF (congestive heart failure)  - Cannot rule out new onset heart failure    Sepsis-  Organ dysfunction indicated by Acute kidney injury, Acute liver injury and Acute heart failure    Fluid challenge Other- Patient to receive 2 L volume other than 30cc/kg due to Volume overload due to- Presumed CHF     [facility], Dr. Knowles, 01/26   x BNP Baseline BNP 1 month ago was 8000    [facility]NP= 5,359   [facility]    Labs, 01/26   x EF/Echo ECHO  The left ventricle is normal in size with normal LV systolic function.  The estimated ejection fraction is 55%.  Atrial fibrillation observed.  Normal right ventricular size with normal right ventricular systolic function.  Mild tricuspid regurgitation.  Intermediate central venous pressure (8 mmHg).  The estimated PA systolic pressure is 31 mmHg.  Trivial pericardial effusion.   Radiology Results, 01/26   x Radiology findings Chest xray-  Right greater than left bibasilar atelectasis/infiltrate and mild bilateral pleural effusion   Radiology results, 01/26    Subjective/Objective Respiratory Conditions Review of Systems-  Respiratory:  Positive for cough, chest tightness and shortness of breath    Examination of the left-upper field  reveals rales    Examination of the left-lower field reveals rales   [facility], Dr. Knowles, 01/26    Recent/Current MI      Heart Transplant, LVAD      Edema, JVD      Ascites      Diuretics/Meds     x Other Treatment Monitor fluid status   [facility], Dr. Knowles, 01/26   x Other Discussed code status with niece who is POA. She wishes to change status to DNR and focus on comfort [facility], Dr. Wood, 01/29     Heart failure is a clinical diagnosis which includes symptomatic fluid retention, elevated intracardiac pressures, and/or the inability of the heart to deliver adequate blood flow.    Heart Failure with reduced Ejection Fraction (HFrEF) or Systolic Heart Failure (loses ability to contract normally, EF is <40%)    Heart Failure with preserved Ejection Fraction (HFpEF) or Diastolic Heart Failure (stiff ventricles, does not relax properly, EF is >50%)     Heart Failure with Combined Systolic and Diastolic Failure (stiff ventricles, does not relax properly and EF is <50%)    Mid-range or mildly reduced ejection fraction (HFmrEF) is classified as systolic heart failure.  Congestive heart failure with a recovered EF is classified as Diastolic Heart Failure.  Common clues to acute exacerbation:  Rapidly progressive symptoms (w/in 2 weeks of presentation), using IV diuretics, using supplemental O2, pulmonary edema on Xray, new or worsening pleural effusion, +JVD or other signs of volume overload, MI w/in 4 weeks, and/or BNP >500  The clinical guidelines noted are only system guidelines, and do not replace the providers clinical judgment.    Provider, please specify the diagnosis associated with the above clinical findings.    [   ]  Acute Diastolic Heart Failure (HFpEF) - new diagnosis   [   ]  Acute on Chronic Diastolic Heart Failure (HFpEF) - worsening of CHF signs/symptoms in preexisting CHF   [   ]  Other (please specify): ___________________________________   [ x ]  Clinically Undetermined           Please  document in your progress notes daily for the duration of treatment until resolved and include in your discharge summary.    References:  American Heart Association editorial staff. (2017, May). Ejection Fraction Heart Failure Measurement. American Heart Association. https://www.heart.org/en/health-topics/heart-failure/diagnosing-heart-failure/ejection-fraction-heart-failure-measurement#:~:text=Ejection%20fraction%20(EF)%20is%20a,pushed%20out%20with%20each%20heartbeat  KP Capellan (2020, December 15). Heart failure with preserved ejection fraction: Clinical manifestations and diagnosis. [employer]. https://www.[employer]/contents/heart-failure-with-preserved-ejection-fraction-clinical-manifestations-and-diagnosis.  ICD-10-CM/PCS Coding Clinic Third Quarter ICD-10, Effective with discharges: September 8, 2020 [facility] § Heart failure with mid-range or mildly reduced ejection fraction (2020).  ICD-10-CM/PCS Coding Clinic Third Quarter ICD-10, Effective with discharges: September 8, 2020 [facility] § Heart failure with recovered ejection fraction (2020).  Form No. 59123

## 2023-01-31 NOTE — SUBJECTIVE & OBJECTIVE
Interval History: Patient not responding to verbal stimuli this am but does respond to painful stimuli. Family at bedside. Patient continues to decline.    Review of Systems   Constitutional:  Positive for fatigue. Negative for fever.   HENT:  Negative for congestion, ear discharge, ear pain, postnasal drip and rhinorrhea.    Eyes:  Negative for pain, discharge and itching.   Respiratory:  Negative for cough, choking and shortness of breath.    Cardiovascular:  Positive for leg swelling.   Gastrointestinal:  Positive for abdominal distention and abdominal pain. Negative for anal bleeding, constipation and diarrhea.   Endocrine: Negative for cold intolerance, heat intolerance and polydipsia.   Genitourinary:  Positive for difficulty urinating.   Musculoskeletal:  Negative for arthralgias, back pain and myalgias.   Skin:  Negative for wound.   Allergic/Immunologic: Negative for environmental allergies, food allergies and immunocompromised state.   Neurological:  Positive for weakness. Negative for seizures, numbness and headaches.   Hematological: Negative.    Psychiatric/Behavioral:  Negative for agitation.    Objective:     Vital Signs (Most Recent):  Temp: 96.5 °F (35.8 °C) (01/31/23 1216)  Pulse: 67 (01/31/23 1216)  Resp: 20 (01/31/23 1342)  BP: (!) 83/35 (01/31/23 1216)  SpO2: (!) 94 % (01/31/23 1216)   Vital Signs (24h Range):  Temp:  [96.5 °F (35.8 °C)-97.6 °F (36.4 °C)] 96.5 °F (35.8 °C)  Pulse:  [59-80] 67  Resp:  [14-20] 20  SpO2:  [82 %-100 %] 94 %  BP: ()/(34-49) 83/35     Weight: 71.2 kg (157 lb)  Body mass index is 25.35 kg/m².  No intake or output data in the 24 hours ending 01/31/23 1346   Physical Exam  Constitutional:       Appearance: He is obese. He is ill-appearing. He is not toxic-appearing.   HENT:      Head: Normocephalic.      Right Ear: Tympanic membrane normal.      Left Ear: Tympanic membrane normal.      Nose: Nose normal.      Mouth/Throat:      Mouth: Mucous membranes are moist.       Pharynx: Oropharynx is clear.   Eyes:      Conjunctiva/sclera: Conjunctivae normal.      Pupils: Pupils are equal, round, and reactive to light.   Cardiovascular:      Rate and Rhythm: Normal rate and regular rhythm.      Pulses: Normal pulses.      Heart sounds: Normal heart sounds.   Pulmonary:      Effort: Pulmonary effort is normal.      Breath sounds: Rhonchi present.   Abdominal:      General: Abdomen is flat. Bowel sounds are normal. There is distension.      Tenderness: There is abdominal tenderness.   Musculoskeletal:         General: Tenderness present. Normal range of motion.      Cervical back: Normal range of motion.      Right lower leg: Edema present.      Left lower leg: Edema present.   Skin:     General: Skin is warm and dry.      Capillary Refill: Capillary refill takes less than 2 seconds.      Coloration: Skin is jaundiced.   Neurological:      Motor: Weakness present.   Psychiatric:         Mood and Affect: Mood normal.         Behavior: Behavior normal.       Significant Labs: All pertinent labs within the past 24 hours have been reviewed.  BMP:   Recent Labs   Lab 01/30/23  0351   GLU 76   *   K 5.3*   CL 92*   CO2 15*   BUN 93*   CREATININE 4.35*   CALCIUM 7.3*     CBC:   Recent Labs   Lab 01/30/23  0351   WBC 19.07*   HGB 12.3*   HCT 34.8*   PLT 94*       Significant Imaging: I have reviewed all pertinent imaging results/findings within the past 24 hours.

## 2023-01-31 NOTE — PROGRESS NOTES
"[facility] Hospital Medicine  Progress Note    Patient Name: Oleksandr Landeros  MRN: [id number]  Patient Class: IP- Inpatient   Admission Date: 1/25/2023  Length of Stay: 6 days  Attending Physician: Branden Jeffrey Jr., MD  Primary Care Provider: Elliott Fournier DO        Subjective:     Principal Problem:Polycystic liver disease        HPI:  Patient is a 78 y/o male transferred to [facility] from [place] with a cc of "Chest Pain." He states the pain was present for about an hour. At that time he was short of breath and his abdomen was hurting.  The pain is located in the upper and lower abdomen it does not radiate. Patient reports he tested positive for Covid 19, 9 days ago. Patient endorses constipation, decreased appetite, fatigue, cough, jaundice.    Patient had recent MRI of  Abdomen 12/19/2022 (per radiology report) Findings which can be seen in metastatic disease although cholangiocarcinoma is also differential concern.  Ultrasound-guided or CT-guided soft tissue sampling is recommended.There is enhancing irregular soft tissue surrounding the gallbladder.     Outlying [facility] Emergency Department findings include:  Vitals: /68, , RR 18, Temp 97.3 F, O2 96%  Labs :WBC 22.22, HGB 14.6, HCT 44.8, MCV 91.2            Na 140, K 5.1, BUN 61, CR 2.04, Glucose 95, eGFR 33          Alk Phos 469, , ALT 99 Total Bilirubin 8.5, Pro BNP 8730, Baseline 4000          UA WBCs 5-10, RBCs 10-15          BC x 2 pending  Imaging: US Abdomen  (per radiology report) Evidence of widespread hepatic metastatic disease. Suspect cholelithiasis. No abnormal biliary dilatation. Right pleural effusion. Trace ascites.  Interventions:  Approximately 2 L NS, Zosyn, Dilaudid     Patient was subsequently admitted to the hospital medicine service under the direct supervision of Dr Bernard for further evaluation and management.      Overview/Hospital Course:  No notes on " file    Interval History: Patient not responding to verbal stimuli this am but does respond to painful stimuli. Family at bedside. Patient continues to decline.    Review of Systems   Constitutional:  Positive for fatigue. Negative for fever.   HENT:  Negative for congestion, ear discharge, ear pain, postnasal drip and rhinorrhea.    Eyes:  Negative for pain, discharge and itching.   Respiratory:  Negative for cough, choking and shortness of breath.    Cardiovascular:  Positive for leg swelling.   Gastrointestinal:  Positive for abdominal distention and abdominal pain. Negative for anal bleeding, constipation and diarrhea.   Endocrine: Negative for cold intolerance, heat intolerance and polydipsia.   Genitourinary:  Positive for difficulty urinating.   Musculoskeletal:  Negative for arthralgias, back pain and myalgias.   Skin:  Negative for wound.   Allergic/Immunologic: Negative for environmental allergies, food allergies and immunocompromised state.   Neurological:  Positive for weakness. Negative for seizures, numbness and headaches.   Hematological: Negative.    Psychiatric/Behavioral:  Negative for agitation.    Objective:     Vital Signs (Most Recent):  Temp: 96.5 °F (35.8 °C) (01/31/23 1216)  Pulse: 67 (01/31/23 1216)  Resp: 20 (01/31/23 1342)  BP: (!) 83/35 (01/31/23 1216)  SpO2: (!) 94 % (01/31/23 1216)   Vital Signs (24h Range):  Temp:  [96.5 °F (35.8 °C)-97.6 °F (36.4 °C)] 96.5 °F (35.8 °C)  Pulse:  [59-80] 67  Resp:  [14-20] 20  SpO2:  [82 %-100 %] 94 %  BP: ()/(34-49) 83/35     Weight: 71.2 kg (157 lb)  Body mass index is 25.35 kg/m².  No intake or output data in the 24 hours ending 01/31/23 1346   Physical Exam  Constitutional:       Appearance: He is obese. He is ill-appearing. He is not toxic-appearing.   HENT:      Head: Normocephalic.      Right Ear: Tympanic membrane normal.      Left Ear: Tympanic membrane normal.      Nose: Nose normal.      Mouth/Throat:      Mouth: Mucous membranes are  moist.      Pharynx: Oropharynx is clear.   Eyes:      Conjunctiva/sclera: Conjunctivae normal.      Pupils: Pupils are equal, round, and reactive to light.   Cardiovascular:      Rate and Rhythm: Normal rate and regular rhythm.      Pulses: Normal pulses.      Heart sounds: Normal heart sounds.   Pulmonary:      Effort: Pulmonary effort is normal.      Breath sounds: Rhonchi present.   Abdominal:      General: Abdomen is flat. Bowel sounds are normal. There is distension.      Tenderness: There is abdominal tenderness.   Musculoskeletal:         General: Tenderness present. Normal range of motion.      Cervical back: Normal range of motion.      Right lower leg: Edema present.      Left lower leg: Edema present.   Skin:     General: Skin is warm and dry.      Capillary Refill: Capillary refill takes less than 2 seconds.      Coloration: Skin is jaundiced.   Neurological:      Motor: Weakness present.   Psychiatric:         Mood and Affect: Mood normal.         Behavior: Behavior normal.       Significant Labs: All pertinent labs within the past 24 hours have been reviewed.  BMP:   Recent Labs   Lab 01/30/23  0351   GLU 76   *   K 5.3*   CL 92*   CO2 15*   BUN 93*   CREATININE 4.35*   CALCIUM 7.3*     CBC:   Recent Labs   Lab 01/30/23  0351   WBC 19.07*   HGB 12.3*   HCT 34.8*   PLT 94*       Significant Imaging: I have reviewed all pertinent imaging results/findings within the past 24 hours.      Assessment/Plan:      * Polycystic liver disease  Polycystic liver vs metastasis vs cholangicarcinoma  -HIDA scan  Heterogeneous uptake of radiotracer by the liver which correlates with recent MRI abdomen from December 29, 2022 which demonstrated multiple hepatic masses   No biliary excretion of radiotracer after 4 hours.  This appearance can be associated with diffuse hepatocellular dysfunction.  Complete common bile duct obstruction can also have this appearance, though that is felt to be unlikely given the fact  that the common bile duct measured 4 mm diameter on the January 25, 2023 ultrasound.  -   1/30/23  -Family Decided to make patient comfort measures. They declined further imaging or procedures for now  -All scheduled medications discontinued.   -Continue Pain medication and Nausea medication      Pneumonia  CXR reveals potential pneumonia on right side. Patient has been in nursing facility for 5 years now, will treat as HAP due to these concerns with Zosyn and Azithryomycin.         Paroxysmal atrial fibrillation  Patient with Long standing persistent (>12 months) atrial fibrillation which is uncontrolled currently with Calcium Channel Blocker. Patient is currently in atrial fibrillation.[id number] Score: 2. HASBLED Score: 4.   -patient has episodes of tachycardia with HR in 140s and comes back down without pharmacologic intervention.   - Continue Cardizem  - EKG showed Afib with RVR  - Holding Eliquis 2/2 possible procedure. Would consider VTE prophylaxis in AM.    CHF (congestive heart failure)  - Cannot rule out new onset heart failure  - BNP 5359   - Echo    The left ventricle is normal in size with normal LV systolic function.   The estimated ejection fraction is 55%.   Atrial fibrillation observed.   Normal right ventricular size with normal right ventricular systolic function.   Mild tricuspid regurgitation.   Intermediate central venous pressure (8 mmHg).   The estimated PA systolic pressure is 31 mmHg.   Trivial pericardial effusion.  -Troponin WNL 31.8  - Monitor fluid status    Atrial fibrillation with rapid ventricular response  Patient with Paroxysmal (<7 days) atrial fibrillation which is controlled currently with Calcium Channel Blocker. Patient is currently in sinus rhythm.[id number] Score: 2.  Anticoagulation not indicated due to procedure.        BPH (benign prostatic hyperplasia)  - Continue Flomax      Sepsis  Antibiotics given-   Antibiotics (72h ago, onward)    None        Latest  lactate reviewed-  No results for input(s): LACTATE in the last 72 hours.  Organ dysfunction indicated by Acute kidney injury, Acute liver injury and Acute heart failure    Fluid challenge Other- Patient to receive 2 L volume other than 30cc/kg due to Volume overload due to- Presumed CHF     Post- resuscitation assessment Yes Perfusion exam was performed within 6 hours of septic shock presentation after bolus shows Adequate tissue perfusion assessed by non-invasive monitoring       Will Not start Pressors- Levophed for MAP of 65  Source control achieved by: Antibiotics    - Suspected source includes Pneumonia vs UTI vs Cholecystitis   - qSOFA score: 0 points  - Initial 2 SIRS criteria met:  and WBC 22.22  - LR 1 L IV at 250 mL/hr x1  - BC x2 NGTD  - CXR demonstrates predominantly right sided basilar atelectasis/infiltrate and mild bilateral pleural effusion  -IR consult to thoracentesis pleural effusion for therapeutic and diagnositic benefit  - surgery consulted regarding elevated liver enzymes, they do not believe there is any surgical needs they could provide at this time, as we await cytology results from the pleural effusion, if cytology results inconclusive will likely need to get a liver biopsy.   -KUB suggestive of mild ileus due to gaseous distension of small bowel.  -HIDA scan   Heterogeneous uptake of radiotracer by the liver which correlates with recent MRI abdomen from December 29, 2022 which demonstrated multiple hepatic masses   No biliary excretion of radiotracer after 4 hours.  This appearance can be associated with diffuse hepatocellular dysfunction.  Complete common bile duct obstruction can also have this appearance, though that is felt to be unlikely given the fact that the common bile duct measured 4 mm diameter on the January 25, 2023 ultrasound.  - Will start on zosyn, azithromycin for PNA Abx  - worsening hepatic enzymes, and renal function.         VTE Risk Mitigation (From admission,  onward)    None          Discharge Planning   ELENA:      Code Status: DNR   Is the patient medically ready for discharge?:     Reason for patient still in hospital (select all that apply): Treatment  Discharge Plan A: Return to nursing home                  Harpal Patel MD  Department of Hospital Medicine   [facility]
